# Patient Record
Sex: FEMALE | Race: BLACK OR AFRICAN AMERICAN | NOT HISPANIC OR LATINO
[De-identification: names, ages, dates, MRNs, and addresses within clinical notes are randomized per-mention and may not be internally consistent; named-entity substitution may affect disease eponyms.]

---

## 2020-09-20 ENCOUNTER — TRANSCRIPTION ENCOUNTER (OUTPATIENT)
Age: 70
End: 2020-09-20

## 2020-09-21 ENCOUNTER — INPATIENT (INPATIENT)
Facility: HOSPITAL | Age: 70
LOS: 7 days | Discharge: HOME | DRG: 909 | End: 2020-09-29
Attending: GENERAL ACUTE CARE HOSPITAL | Admitting: GENERAL ACUTE CARE HOSPITAL
Payer: MEDICARE

## 2020-09-21 VITALS
OXYGEN SATURATION: 95 % | HEART RATE: 67 BPM | HEIGHT: 68 IN | SYSTOLIC BLOOD PRESSURE: 113 MMHG | WEIGHT: 201.94 LBS | DIASTOLIC BLOOD PRESSURE: 66 MMHG | RESPIRATION RATE: 18 BRPM | TEMPERATURE: 98 F

## 2020-09-21 DIAGNOSIS — Z98.890 OTHER SPECIFIED POSTPROCEDURAL STATES: Chronic | ICD-10-CM

## 2020-09-21 PROCEDURE — 45331 SIGMOIDOSCOPY AND BIOPSY: CPT

## 2020-09-21 PROCEDURE — 99222 1ST HOSP IP/OBS MODERATE 55: CPT | Mod: 57

## 2020-09-21 PROCEDURE — 44238 UNLISTED LAPS PX INTESTINE: CPT | Mod: LT

## 2020-09-21 PROCEDURE — 99285 EMERGENCY DEPT VISIT HI MDM: CPT

## 2020-09-21 PROCEDURE — 71045 X-RAY EXAM CHEST 1 VIEW: CPT | Mod: 26

## 2020-09-21 RX ORDER — BUPIVACAINE 13.3 MG/ML
20 INJECTION, SUSPENSION, LIPOSOMAL INFILTRATION ONCE
Refills: 0 | Status: DISCONTINUED | OUTPATIENT
Start: 2020-09-21 | End: 2020-09-24

## 2020-09-21 RX ORDER — HYDROMORPHONE HYDROCHLORIDE 2 MG/ML
1 INJECTION INTRAMUSCULAR; INTRAVENOUS; SUBCUTANEOUS
Refills: 0 | Status: DISCONTINUED | OUTPATIENT
Start: 2020-09-21 | End: 2020-09-22

## 2020-09-21 RX ORDER — PIPERACILLIN AND TAZOBACTAM 4; .5 G/20ML; G/20ML
3.38 INJECTION, POWDER, LYOPHILIZED, FOR SOLUTION INTRAVENOUS EVERY 6 HOURS
Refills: 0 | Status: DISCONTINUED | OUTPATIENT
Start: 2020-09-21 | End: 2020-09-23

## 2020-09-21 RX ORDER — HYDROMORPHONE HYDROCHLORIDE 2 MG/ML
0.5 INJECTION INTRAMUSCULAR; INTRAVENOUS; SUBCUTANEOUS EVERY 4 HOURS
Refills: 0 | Status: DISCONTINUED | OUTPATIENT
Start: 2020-09-21 | End: 2020-09-21

## 2020-09-21 RX ORDER — PIPERACILLIN AND TAZOBACTAM 4; .5 G/20ML; G/20ML
3.38 INJECTION, POWDER, LYOPHILIZED, FOR SOLUTION INTRAVENOUS ONCE
Refills: 0 | Status: COMPLETED | OUTPATIENT
Start: 2020-09-21 | End: 2020-09-21

## 2020-09-21 RX ORDER — SODIUM CHLORIDE 9 MG/ML
1000 INJECTION, SOLUTION INTRAVENOUS
Refills: 0 | Status: DISCONTINUED | OUTPATIENT
Start: 2020-09-21 | End: 2020-09-21

## 2020-09-21 RX ORDER — HEPARIN SODIUM 5000 [USP'U]/ML
5000 INJECTION INTRAVENOUS; SUBCUTANEOUS EVERY 8 HOURS
Refills: 0 | Status: DISCONTINUED | OUTPATIENT
Start: 2020-09-21 | End: 2020-09-29

## 2020-09-21 RX ORDER — CEFTRIAXONE 500 MG/1
1000 INJECTION, POWDER, FOR SOLUTION INTRAMUSCULAR; INTRAVENOUS ONCE
Refills: 0 | Status: DISCONTINUED | OUTPATIENT
Start: 2020-09-21 | End: 2020-09-21

## 2020-09-21 RX ORDER — HYDROMORPHONE HYDROCHLORIDE 2 MG/ML
1 INJECTION INTRAMUSCULAR; INTRAVENOUS; SUBCUTANEOUS ONCE
Refills: 0 | Status: DISCONTINUED | OUTPATIENT
Start: 2020-09-21 | End: 2020-09-21

## 2020-09-21 RX ORDER — HEPARIN SODIUM 5000 [USP'U]/ML
5000 INJECTION INTRAVENOUS; SUBCUTANEOUS EVERY 8 HOURS
Refills: 0 | Status: DISCONTINUED | OUTPATIENT
Start: 2020-09-21 | End: 2020-09-21

## 2020-09-21 RX ORDER — OXYCODONE AND ACETAMINOPHEN 5; 325 MG/1; MG/1
1 TABLET ORAL EVERY 4 HOURS
Refills: 0 | Status: DISCONTINUED | OUTPATIENT
Start: 2020-09-21 | End: 2020-09-22

## 2020-09-21 RX ORDER — ONDANSETRON 8 MG/1
4 TABLET, FILM COATED ORAL EVERY 6 HOURS
Refills: 0 | Status: DISCONTINUED | OUTPATIENT
Start: 2020-09-21 | End: 2020-09-21

## 2020-09-21 RX ORDER — HYDROMORPHONE HYDROCHLORIDE 2 MG/ML
1 INJECTION INTRAMUSCULAR; INTRAVENOUS; SUBCUTANEOUS EVERY 4 HOURS
Refills: 0 | Status: DISCONTINUED | OUTPATIENT
Start: 2020-09-21 | End: 2020-09-21

## 2020-09-21 RX ORDER — PIPERACILLIN AND TAZOBACTAM 4; .5 G/20ML; G/20ML
3.38 INJECTION, POWDER, LYOPHILIZED, FOR SOLUTION INTRAVENOUS EVERY 6 HOURS
Refills: 0 | Status: DISCONTINUED | OUTPATIENT
Start: 2020-09-21 | End: 2020-09-21

## 2020-09-21 RX ORDER — METRONIDAZOLE 500 MG
500 TABLET ORAL ONCE
Refills: 0 | Status: DISCONTINUED | OUTPATIENT
Start: 2020-09-21 | End: 2020-09-21

## 2020-09-21 RX ORDER — LANOLIN ALCOHOL/MO/W.PET/CERES
3 CREAM (GRAM) TOPICAL AT BEDTIME
Refills: 0 | Status: DISCONTINUED | OUTPATIENT
Start: 2020-09-21 | End: 2020-09-29

## 2020-09-21 RX ORDER — OXYCODONE AND ACETAMINOPHEN 5; 325 MG/1; MG/1
2 TABLET ORAL EVERY 6 HOURS
Refills: 0 | Status: DISCONTINUED | OUTPATIENT
Start: 2020-09-21 | End: 2020-09-22

## 2020-09-21 RX ORDER — ONDANSETRON 8 MG/1
4 TABLET, FILM COATED ORAL EVERY 6 HOURS
Refills: 0 | Status: DISCONTINUED | OUTPATIENT
Start: 2020-09-21 | End: 2020-09-29

## 2020-09-21 RX ORDER — SODIUM CHLORIDE 9 MG/ML
1000 INJECTION, SOLUTION INTRAVENOUS
Refills: 0 | Status: DISCONTINUED | OUTPATIENT
Start: 2020-09-21 | End: 2020-09-25

## 2020-09-21 RX ADMIN — PIPERACILLIN AND TAZOBACTAM 200 GRAM(S): 4; .5 INJECTION, POWDER, LYOPHILIZED, FOR SOLUTION INTRAVENOUS at 12:21

## 2020-09-21 RX ADMIN — HYDROMORPHONE HYDROCHLORIDE 1 MILLIGRAM(S): 2 INJECTION INTRAMUSCULAR; INTRAVENOUS; SUBCUTANEOUS at 20:00

## 2020-09-21 RX ADMIN — HYDROMORPHONE HYDROCHLORIDE 1 MILLIGRAM(S): 2 INJECTION INTRAMUSCULAR; INTRAVENOUS; SUBCUTANEOUS at 12:00

## 2020-09-21 RX ADMIN — HYDROMORPHONE HYDROCHLORIDE 1 MILLIGRAM(S): 2 INJECTION INTRAMUSCULAR; INTRAVENOUS; SUBCUTANEOUS at 19:43

## 2020-09-21 RX ADMIN — SODIUM CHLORIDE 130 MILLILITER(S): 9 INJECTION, SOLUTION INTRAVENOUS at 13:13

## 2020-09-21 RX ADMIN — HYDROMORPHONE HYDROCHLORIDE 1 MILLIGRAM(S): 2 INJECTION INTRAMUSCULAR; INTRAVENOUS; SUBCUTANEOUS at 11:10

## 2020-09-21 RX ADMIN — PIPERACILLIN AND TAZOBACTAM 100 GRAM(S): 4; .5 INJECTION, POWDER, LYOPHILIZED, FOR SOLUTION INTRAVENOUS at 18:44

## 2020-09-21 RX ADMIN — HEPARIN SODIUM 5000 UNIT(S): 5000 INJECTION INTRAVENOUS; SUBCUTANEOUS at 21:54

## 2020-09-21 RX ADMIN — HYDROMORPHONE HYDROCHLORIDE 1 MILLIGRAM(S): 2 INJECTION INTRAMUSCULAR; INTRAVENOUS; SUBCUTANEOUS at 13:10

## 2020-09-21 RX ADMIN — HYDROMORPHONE HYDROCHLORIDE 1 MILLIGRAM(S): 2 INJECTION INTRAMUSCULAR; INTRAVENOUS; SUBCUTANEOUS at 13:40

## 2020-09-21 RX ADMIN — Medication 3 MILLIGRAM(S): at 21:54

## 2020-09-21 NOTE — ED PROVIDER NOTE - ATTENDING CONTRIBUTION TO CARE
70F PMH HTN, depression, GERD (s/p laparoscopic Nissen fundoplication in 2012 at [place]) p/w abd pain after colonoscopy this morning. GI Dr. Finkelstein called Dr. Marion. Reportedly +perf. No other systemic symptoms. Vitals wnl, exam as above.  ddx: concern for perf.  pre-op labs, symptom control/abx, d/w surgery, will admit for further care.

## 2020-09-21 NOTE — BRIEF OPERATIVE NOTE - OPERATION/FINDINGS
Procedure: repair of sigmoid Procedure: YONATAN, diagnostic laparoscopy, repair of sigmoid colon primarily, colonoscopy, methylene blue leak test    Laparoscopic port sites established and diagnostic laparoscopy performed. No obvious perforation identified. Colonoscopy performed, identifying a perforation at approximately 25 cm proximal from anal verge within sigmoid colon. Perforation was then identified and isolated laparoscopically. Given <50% circumferential involvement, perforation was sutured primarily in a two layer-fashion. Methylene blue leak test performed and found to be negative. 19Fr Henok placed in pelvis. Laparoscopic ports closed with 4-0 Monocryl.

## 2020-09-21 NOTE — PROGRESS NOTE ADULT - SUBJECTIVE AND OBJECTIVE BOX
POST-OPERATIVE NOTE    Procedure: diagnostic laparoscopy, YONATAN, repair of sigmoid colon primarily, colonoscopy, methylene blue leak test    Diagnosis/Indication: perforation in sigmoid     Surgeon: Bib     S: Pt has no complaints. Denies CP, SOB, KUMAR, calf tenderness. Pain controlled with medication. Denies vomiting, but endorses some trace nausea. AWRBF. VSS.    O:  T(C): 36.2 (09-21-20 @ 20:05), Max: 36.2 (09-21-20 @ 20:05)  T(F): 97.2 (09-21-20 @ 20:05), Max: 97.2 (09-21-20 @ 20:05)  HR: 78 (09-21-20 @ 20:30) (76 - 83)  BP: 114/68 (09-21-20 @ 20:30) (114/68 - 144/83)  RR: 18 (09-21-20 @ 20:30) (17 - 20)  SpO2: 95% (09-21-20 @ 20:30) (95% - 99%)  Wt(kg): --                        13.6   9.05  )-----------( 159      ( 21 Sep 2020 11:06 )             42.1     09-21    138  |  100  |  11  ----------------------------<  139<H>  3.7   |  26  |  0.94    Ca    9.6      21 Sep 2020 11:06    TPro  7.3  /  Alb  4.1  /  TBili  0.6  /  DBili  x   /  AST  22  /  ALT  14  /  AlkPhos  92  09-21      Gen: NAD, resting comfortably in bed  C/V: NSR  Pulm: Nonlabored breathing, no respiratory distress  Abd: post-operative distention, soft/ cherelle-incisional tenderness, incisions - clean, dry, glued.   : tolliver in place  Extrem: WWP, no calf edema or tenderness, SCDs in place

## 2020-09-21 NOTE — H&P ADULT - NSHPLABSRESULTS_GEN_ALL_CORE
LABS:                        13.6   9.05  )-----------( 159      ( 21 Sep 2020 11:06 )             42.1       PT/INR - ( 21 Sep 2020 11:06 )   PT: 12.7 sec;   INR: 1.06          PTT - ( 21 Sep 2020 11:06 )  PTT:29.1 sec  CAPILLARY BLOOD GLUCOSE      Radiology and Additional Studies:

## 2020-09-21 NOTE — H&P ADULT - ASSESSMENT
70F w/ PMHx of HTN, depression, GERD (s/p laparoscopic Nissen fundoplication in 2012 at [place]) presents with sharp abdominal pain following outpatient colonoscopy this morning. Afebrile, HDS. Clinical picture with concern for perforation. Added on urgently to OR for diagnostic lap.    -Admit to General Surgery Team 4-telemetry  -Pain/nausea control PRN  -NPO, IVF  -SQH, SCDs, OOB/A, IS  -AM labs  -Discussed with Chief resident on call and Dr. Mccartney 70F w/ PMHx of HTN, depression, GERD (s/p laparoscopic Nissen fundoplication in 2012 at [facility]) presents with sharp abdominal pain following outpatient colonoscopy this morning. Afebrile, HDS. Clinical picture with concern for perforation. Added on urgently to OR for diagnostic lap.    -Admit to General Surgery Team 4-telemetry  -Pain/nausea control PRN  -NPO, IVF  -Zosyn  -SQH, SCDs, OOB/A, IS  -AM labs  -Discussed with Chief resident on call and Dr. Mccartney

## 2020-09-21 NOTE — ED ADULT NURSE NOTE - CHPI ED NUR SYMPTOMS NEG
no diarrhea/no dysuria/no hematuria/no nausea/no abdominal distension/no blood in stool/no burning urination/no vomiting/no chills/no fever

## 2020-09-21 NOTE — ED PROVIDER NOTE - OBJECTIVE STATEMENT
69 y/o F w/ PMHx HTN, depression, GERD (s/p laparoscopic Nissen fundoplication in 2012 at [place]), presents to the ED sent from GI doctor s/p colonoscopy this morning for suspected perforated intestine during procedure, c/o severe pain all over abd so sent to ED. Denies fever, chills, n/v or any other symptoms.

## 2020-09-21 NOTE — H&P ADULT - HISTORY OF PRESENT ILLNESS
70F w/ PMHx of HTN, depression, GERD (s/p laparoscopic Nissen fundoplication in 2012 at [place]) presents with sharp abdominal pain following outpatient colonoscopy this morning. Per the patient, she was getitng her routine colonoscopy this monring and said that the doctor "saw a perforation" during the procedure. Her doctor, Dr. Finkelstein, called Dr. Marion and the patient was sent urgently to the [facility] ED. The patient reports a history of GERD for which she gets scoped periodically. She was due for a colonoscopy soon and was having LLQ abdominal pain for a few weeks with associated blood in the stool so she shcedule her exam for today. Her last colonoscopy was 2 years ago and was reportedly normal. She is currently in 10/10 pain and writhing in the bed. She describes the pain as diffuse, constant, and severe. No associated fevers or chills. No n/v, CP, SOB, dizziness, ligheheadedness, dysuria, constipation, diarrhea.     PMH: as above  PSH: Nissen 2012, hysterectomy 2008  Meds: HCTZ 25mg daily, protonix 40mg daily, prozac 60mg daily   All: ACE inhibitors, compazine   FH: noncontributory  SH: nonsmoker, social ETOH, no illicit substances

## 2020-09-21 NOTE — PACU DISCHARGE NOTE - COMMENTS
pt aao x3.  VSS.  lap sites to right abd x3 with dermabond intact.  LLQ RAN dsg c/d/i; RAN to ss with serosanguinous drainage.  IVF infusing.  tolliver to bsd.  denies c/o pain at present.  report given to RN on 8 lachman; pt to go to [facility] via bed with transport

## 2020-09-21 NOTE — ED ADULT NURSE NOTE - OBJECTIVE STATEMENT
PT presents s/p routine colonoscopy this am, srugical consult team greeted pt on arrival to ED, stated suspicion is high for perforated colon. Pt presents writhing in pain, states pain is worst in the left lower quadrant but radiates throughout lower abdomen. Pt presents afebrile.

## 2020-09-21 NOTE — ED PROVIDER NOTE - CLINICAL SUMMARY MEDICAL DECISION MAKING FREE TEXT BOX
69 y/o F presents to the ED sent from GI after had colonoscopy this morning w/ possible intestinal perforation. Here in ED VSS. Pt in a lot of pain. Surgery already evaluated at bedside. Planning to take pt to OR. Pre op labs sent. Given pain meds and covered w/ abx. Will f/u surgery recommendations.

## 2020-09-21 NOTE — ED PROVIDER NOTE - CONSTITUTIONAL, MLM
normal... Awake, alert, oriented to person, place, time/situation, lying on stretcher appears very uncomfortable

## 2020-09-21 NOTE — H&P ADULT - NSHPPHYSICALEXAM_GEN_ALL_CORE
Vital Signs Last 24 Hrs  T(C): 36.6 (21 Sep 2020 10:30), Max: 36.6 (21 Sep 2020 10:30)  T(F): 97.8 (21 Sep 2020 10:30), Max: 97.8 (21 Sep 2020 10:30)  HR: 67 (21 Sep 2020 10:30) (67 - 67)  BP: 113/66 (21 Sep 2020 10:30) (113/66 - 113/66)  BP(mean): --  RR: 18 (21 Sep 2020 10:30) (18 - 18)  SpO2: 95% (21 Sep 2020 10:30) (95% - 95%)    Physical Exam:  General: NAD, resting comfortably in bed  Pulmonary: Nonlabored breathing, no respiratory distress  Abdominal: obese, softly distended, +exquisitely tender to palpation throughout  Extremities: WWP  Neuro: A/O x 3 Vital Signs Last 24 Hrs  T(C): 36.6 (21 Sep 2020 10:30), Max: 36.6 (21 Sep 2020 10:30)  T(F): 97.8 (21 Sep 2020 10:30), Max: 97.8 (21 Sep 2020 10:30)  HR: 67 (21 Sep 2020 10:30) (67 - 67)  BP: 113/66 (21 Sep 2020 10:30) (113/66 - 113/66)  BP(mean): --  RR: 18 (21 Sep 2020 10:30) (18 - 18)  SpO2: 95% (21 Sep 2020 10:30) (95% - 95%)    Physical Exam:  General: pt writhing in pain   Pulmonary: Nonlabored breathing, no respiratory distress  Abdominal: obese, softly distended, +exquisitely tender to palpation throughout  Extremities: WWP  Neuro: A/O x 3

## 2020-09-21 NOTE — ED ADULT TRIAGE NOTE - CHIEF COMPLAINT QUOTE
patient BIBA from outpatient colonoscopy. found to have perforation in her colon. has been having LLQ abdominal pain for the last few weeks. denies fever, chills + blood in stool

## 2020-09-22 DIAGNOSIS — K21.9 GASTRO-ESOPHAGEAL REFLUX DISEASE WITHOUT ESOPHAGITIS: ICD-10-CM

## 2020-09-22 DIAGNOSIS — K63.1 PERFORATION OF INTESTINE (NONTRAUMATIC): ICD-10-CM

## 2020-09-22 DIAGNOSIS — D69.6 THROMBOCYTOPENIA, UNSPECIFIED: ICD-10-CM

## 2020-09-22 DIAGNOSIS — F32.9 MAJOR DEPRESSIVE DISORDER, SINGLE EPISODE, UNSPECIFIED: ICD-10-CM

## 2020-09-22 DIAGNOSIS — I10 ESSENTIAL (PRIMARY) HYPERTENSION: ICD-10-CM

## 2020-09-22 DIAGNOSIS — D64.9 ANEMIA, UNSPECIFIED: ICD-10-CM

## 2020-09-22 DIAGNOSIS — E66.9 OBESITY, UNSPECIFIED: ICD-10-CM

## 2020-09-22 DIAGNOSIS — E87.6 HYPOKALEMIA: ICD-10-CM

## 2020-09-22 DIAGNOSIS — D72.829 ELEVATED WHITE BLOOD CELL COUNT, UNSPECIFIED: ICD-10-CM

## 2020-09-22 LAB
ANION GAP SERPL CALC-SCNC: 12 MMOL/L — SIGNIFICANT CHANGE UP (ref 5–17)
BUN SERPL-MCNC: 11 MG/DL — SIGNIFICANT CHANGE UP (ref 7–23)
CALCIUM SERPL-MCNC: 8.6 MG/DL — SIGNIFICANT CHANGE UP (ref 8.4–10.5)
CHLORIDE SERPL-SCNC: 99 MMOL/L — SIGNIFICANT CHANGE UP (ref 96–108)
CO2 SERPL-SCNC: 26 MMOL/L — SIGNIFICANT CHANGE UP (ref 22–31)
CREAT SERPL-MCNC: 0.96 MG/DL — SIGNIFICANT CHANGE UP (ref 0.5–1.3)
GLUCOSE SERPL-MCNC: 142 MG/DL — HIGH (ref 70–99)
HCT VFR BLD CALC: 35.4 % — SIGNIFICANT CHANGE UP (ref 34.5–45)
HGB BLD-MCNC: 11.4 G/DL — LOW (ref 11.5–15.5)
MAGNESIUM SERPL-MCNC: 1.8 MG/DL — SIGNIFICANT CHANGE UP (ref 1.6–2.6)
MCHC RBC-ENTMCNC: 27.2 PG — SIGNIFICANT CHANGE UP (ref 27–34)
MCHC RBC-ENTMCNC: 32.2 GM/DL — SIGNIFICANT CHANGE UP (ref 32–36)
MCV RBC AUTO: 84.5 FL — SIGNIFICANT CHANGE UP (ref 80–100)
NRBC # BLD: 0 /100 WBCS — SIGNIFICANT CHANGE UP (ref 0–0)
PHOSPHATE SERPL-MCNC: 2.8 MG/DL — SIGNIFICANT CHANGE UP (ref 2.5–4.5)
PLATELET # BLD AUTO: 134 K/UL — LOW (ref 150–400)
POTASSIUM SERPL-MCNC: 3.3 MMOL/L — LOW (ref 3.5–5.3)
POTASSIUM SERPL-SCNC: 3.3 MMOL/L — LOW (ref 3.5–5.3)
RBC # BLD: 4.19 M/UL — SIGNIFICANT CHANGE UP (ref 3.8–5.2)
RBC # FLD: 16.5 % — HIGH (ref 10.3–14.5)
SODIUM SERPL-SCNC: 137 MMOL/L — SIGNIFICANT CHANGE UP (ref 135–145)
WBC # BLD: 14.29 K/UL — HIGH (ref 3.8–10.5)
WBC # FLD AUTO: 14.29 K/UL — HIGH (ref 3.8–10.5)

## 2020-09-22 PROCEDURE — 99223 1ST HOSP IP/OBS HIGH 75: CPT

## 2020-09-22 RX ORDER — MAGNESIUM SULFATE 500 MG/ML
1 VIAL (ML) INJECTION ONCE
Refills: 0 | Status: COMPLETED | OUTPATIENT
Start: 2020-09-22 | End: 2020-09-22

## 2020-09-22 RX ORDER — FLUOXETINE HCL 10 MG
60 CAPSULE ORAL DAILY
Refills: 0 | Status: DISCONTINUED | OUTPATIENT
Start: 2020-09-22 | End: 2020-09-29

## 2020-09-22 RX ORDER — ZOLPIDEM TARTRATE 10 MG/1
5 TABLET ORAL AT BEDTIME
Refills: 0 | Status: DISCONTINUED | OUTPATIENT
Start: 2020-09-22 | End: 2020-09-25

## 2020-09-22 RX ORDER — OXYCODONE HYDROCHLORIDE 5 MG/1
10 TABLET ORAL EVERY 6 HOURS
Refills: 0 | Status: DISCONTINUED | OUTPATIENT
Start: 2020-09-22 | End: 2020-09-29

## 2020-09-22 RX ORDER — SUMATRIPTAN SUCCINATE 4 MG/.5ML
25 INJECTION, SOLUTION SUBCUTANEOUS ONCE
Refills: 0 | Status: COMPLETED | OUTPATIENT
Start: 2020-09-22 | End: 2020-09-22

## 2020-09-22 RX ORDER — PANTOPRAZOLE SODIUM 20 MG/1
40 TABLET, DELAYED RELEASE ORAL
Refills: 0 | Status: DISCONTINUED | OUTPATIENT
Start: 2020-09-22 | End: 2020-09-29

## 2020-09-22 RX ORDER — POTASSIUM CHLORIDE 20 MEQ
40 PACKET (EA) ORAL ONCE
Refills: 0 | Status: COMPLETED | OUTPATIENT
Start: 2020-09-22 | End: 2020-09-22

## 2020-09-22 RX ORDER — ACETAMINOPHEN 500 MG
650 TABLET ORAL EVERY 6 HOURS
Refills: 0 | Status: DISCONTINUED | OUTPATIENT
Start: 2020-09-22 | End: 2020-09-29

## 2020-09-22 RX ORDER — OXYCODONE HYDROCHLORIDE 5 MG/1
5 TABLET ORAL EVERY 4 HOURS
Refills: 0 | Status: DISCONTINUED | OUTPATIENT
Start: 2020-09-22 | End: 2020-09-29

## 2020-09-22 RX ORDER — ONDANSETRON 8 MG/1
4 TABLET, FILM COATED ORAL ONCE
Refills: 0 | Status: COMPLETED | OUTPATIENT
Start: 2020-09-22 | End: 2020-09-22

## 2020-09-22 RX ADMIN — OXYCODONE AND ACETAMINOPHEN 2 TABLET(S): 5; 325 TABLET ORAL at 15:31

## 2020-09-22 RX ADMIN — ONDANSETRON 4 MILLIGRAM(S): 8 TABLET, FILM COATED ORAL at 21:30

## 2020-09-22 RX ADMIN — Medication 3 MILLIGRAM(S): at 21:31

## 2020-09-22 RX ADMIN — OXYCODONE AND ACETAMINOPHEN 1 TABLET(S): 5; 325 TABLET ORAL at 07:00

## 2020-09-22 RX ADMIN — OXYCODONE AND ACETAMINOPHEN 2 TABLET(S): 5; 325 TABLET ORAL at 16:30

## 2020-09-22 RX ADMIN — Medication 40 MILLIEQUIVALENT(S): at 14:30

## 2020-09-22 RX ADMIN — HEPARIN SODIUM 5000 UNIT(S): 5000 INJECTION INTRAVENOUS; SUBCUTANEOUS at 06:10

## 2020-09-22 RX ADMIN — HEPARIN SODIUM 5000 UNIT(S): 5000 INJECTION INTRAVENOUS; SUBCUTANEOUS at 21:31

## 2020-09-22 RX ADMIN — OXYCODONE HYDROCHLORIDE 5 MILLIGRAM(S): 5 TABLET ORAL at 19:50

## 2020-09-22 RX ADMIN — OXYCODONE AND ACETAMINOPHEN 1 TABLET(S): 5; 325 TABLET ORAL at 00:36

## 2020-09-22 RX ADMIN — PIPERACILLIN AND TAZOBACTAM 100 GRAM(S): 4; .5 INJECTION, POWDER, LYOPHILIZED, FOR SOLUTION INTRAVENOUS at 18:58

## 2020-09-22 RX ADMIN — OXYCODONE AND ACETAMINOPHEN 1 TABLET(S): 5; 325 TABLET ORAL at 01:36

## 2020-09-22 RX ADMIN — PIPERACILLIN AND TAZOBACTAM 100 GRAM(S): 4; .5 INJECTION, POWDER, LYOPHILIZED, FOR SOLUTION INTRAVENOUS at 06:10

## 2020-09-22 RX ADMIN — HEPARIN SODIUM 5000 UNIT(S): 5000 INJECTION INTRAVENOUS; SUBCUTANEOUS at 14:30

## 2020-09-22 RX ADMIN — ZOLPIDEM TARTRATE 5 MILLIGRAM(S): 10 TABLET ORAL at 22:46

## 2020-09-22 RX ADMIN — PIPERACILLIN AND TAZOBACTAM 100 GRAM(S): 4; .5 INJECTION, POWDER, LYOPHILIZED, FOR SOLUTION INTRAVENOUS at 00:25

## 2020-09-22 RX ADMIN — SUMATRIPTAN SUCCINATE 25 MILLIGRAM(S): 4 INJECTION, SOLUTION SUBCUTANEOUS at 11:15

## 2020-09-22 RX ADMIN — ONDANSETRON 4 MILLIGRAM(S): 8 TABLET, FILM COATED ORAL at 08:59

## 2020-09-22 RX ADMIN — OXYCODONE AND ACETAMINOPHEN 1 TABLET(S): 5; 325 TABLET ORAL at 06:16

## 2020-09-22 RX ADMIN — Medication 100 GRAM(S): at 10:55

## 2020-09-22 RX ADMIN — SUMATRIPTAN SUCCINATE 25 MILLIGRAM(S): 4 INJECTION, SOLUTION SUBCUTANEOUS at 10:29

## 2020-09-22 RX ADMIN — Medication 40 MILLIEQUIVALENT(S): at 10:55

## 2020-09-22 RX ADMIN — ONDANSETRON 4 MILLIGRAM(S): 8 TABLET, FILM COATED ORAL at 12:15

## 2020-09-22 RX ADMIN — PIPERACILLIN AND TAZOBACTAM 100 GRAM(S): 4; .5 INJECTION, POWDER, LYOPHILIZED, FOR SOLUTION INTRAVENOUS at 12:04

## 2020-09-22 NOTE — PROGRESS NOTE ADULT - SUBJECTIVE AND OBJECTIVE BOX
HX: POD1 s/p diagnostic laparoscopy, YONATAN, repair of sigmoid colon primarily, colonoscopy, methylene blue leak test     SUBJECTIVE: Patient seen and examined bedside by chief resident. Endorses mild nausea without emesis. Endorses gas, no BMs. On Zosyn.     I&O  OUT:     RAN: 30/55    UOP: 365/900    Vital Signs Last 24 Hrs  T(C): 37 (22 Sep 2020 04:58), Max: 37 (22 Sep 2020 04:58)  T(F): 98.6 (22 Sep 2020 04:58), Max: 98.6 (22 Sep 2020 04:58)  HR: 70 (22 Sep 2020 04:17) (67 - 83)  BP: 117/56 (22 Sep 2020 04:17) (113/66 - 144/83)  BP(mean): 81 (22 Sep 2020 04:17) (81 - 108)  RR: 17 (22 Sep 2020 04:17) (17 - 20)  SpO2: 97% (22 Sep 2020 04:17) (95% - 99%)    General: NAD, resting comfortably in bed  C/V: pulses present in b/l upper extremities   Pulm: Nonlabored breathing, no respiratory distress  Abd: soft, distended, mildly tender, no rebound or guarding, RAN L abdomen SS   Extrem: WWP, no edema, SCDs in place

## 2020-09-22 NOTE — CONSULT NOTE ADULT - ASSESSMENT
70F w/ PMHx of HTN, depression, GERD (s/p laparoscopic Nissen fundoplication in 2012 at [place]) presents with sharp abdominal pain following outpatient colonoscopy this morning with clinical picture with concern for perforation now s/p diagnostic laparoscopy, YONATAN, repair of sigmoid colon primarily.

## 2020-09-22 NOTE — CONSULT NOTE ADULT - SUBJECTIVE AND OBJECTIVE BOX
Patient is a 70y old  Female who presents with a chief complaint of abd pain s/p colonoscopy (21 Sep 2020 20:54)      HPI:  70F w/ PMHx of HTN, depression, GERD (s/p laparoscopic Nissen fundoplication in 2012 at [place]) presents with sharp abdominal pain following outpatient colonoscopy this morning. Per the patient, she was getitng her routine colonoscopy this monring and said that the doctor "saw a perforation" during the procedure. Her doctor, Dr. Finkelstein, called Dr. Marion and the patient was sent urgently to the [facility] ED. The patient reports a history of GERD for which she gets scoped periodically. She was due for a colonoscopy soon and was having LLQ abdominal pain for a few weeks with associated blood in the stool so she shcedule her exam for today. Her last colonoscopy was 2 years ago and was reportedly normal. She is currently in 10/10 pain and writhing in the bed. Clinical picture with concern for perforation now s/p diagnostic laparoscopy, YONATAN, repair of sigmoid colon primarily. Distended with flatus today.     Subjective:  Pt reports some Nausea. Denies skyler pain. Denies SOB/CP. Slight headache. ROS is otherwise negative.     Allergies    ACE inhibitors (Unknown)  Compazine (Unknown)    Intolerances    Home meds: Prozac, HCTZ 25 mg PO qd    MEDICATIONS  (STANDING):  BUpivacaine liposome 1.3% Injectable (no eMAR) 20 milliLiter(s) Local Injection once  FLUoxetine 60 milliGRAM(s) Oral daily  heparin   Injectable 5000 Unit(s) SubCutaneous every 8 hours  lactated ringers. 1000 milliLiter(s) (130 mL/Hr) IV Continuous <Continuous>  melatonin 3 milliGRAM(s) Oral at bedtime  pantoprazole    Tablet 40 milliGRAM(s) Oral before breakfast  piperacillin/tazobactam IVPB.. 3.375 Gram(s) IV Intermittent every 6 hours    MEDICATIONS  (PRN):  ondansetron Injectable 4 milliGRAM(s) IV Push every 6 hours PRN Nausea  oxycodone    5 mG/acetaminophen 325 mG 1 Tablet(s) Oral every 4 hours PRN Moderate Pain (4 - 6)  oxycodone    5 mG/acetaminophen 325 mG 2 Tablet(s) Oral every 6 hours PRN Severe Pain (7 - 10)  zolpidem 5 milliGRAM(s) Oral at bedtime PRN Insomnia  zolpidem 5 milliGRAM(s) Oral at bedtime PRN Insomnia      Drug Dosing Weight  Height (cm): 172.7 (21 Sep 2020 10:30)  Weight (kg): 91.6 (21 Sep 2020 10:30)  BMI (kg/m2): 30.7 (21 Sep 2020 10:30)  BSA (m2): 2.05 (21 Sep 2020 10:30)    PAST MEDICAL & SURGICAL HISTORY:  GERD (gastroesophageal reflux disease)    Depression    HTN (hypertension)    History of Nissen fundoplication        FAMILY HISTORY:  no reported cardiac history reported    SOCIAL HISTORY:  social EtOH use, no smoking, no drug use    ADVANCE DIRECTIVES:    Vital Signs Last 24 Hrs  T(C): 36.3 (22 Sep 2020 14:26), Max: 37 (22 Sep 2020 04:58)  T(F): 97.4 (22 Sep 2020 14:26), Max: 98.6 (22 Sep 2020 04:58)  HR: 64 (22 Sep 2020 12:10) (64 - 83)  BP: 141/74 (22 Sep 2020 12:10) (114/68 - 144/83)  BP(mean): 102 (22 Sep 2020 12:10) (81 - 108)  ABP: --  ABP(mean): --  RR: 16 (22 Sep 2020 12:10) (16 - 20)  SpO2: 93% (22 Sep 2020 12:10) (93% - 99%)          I&O's Detail    21 Sep 2020 07:01  -  22 Sep 2020 07:00  --------------------------------------------------------  IN:    IV PiggyBack: 200 mL    Lactated Ringers: 440 mL  Total IN: 640 mL    OUT:    Bulb (mL): 55 mL    Indwelling Catheter - Urethral (mL): 515 mL  Total OUT: 570 mL    Total NET: 70 mL      22 Sep 2020 07:01  -  22 Sep 2020 16:29  --------------------------------------------------------  IN:    IV PiggyBack: 200 mL    Lactated Ringers: 550 mL    Oral Fluid: 330 mL  Total IN: 1080 mL    OUT:    Indwelling Catheter - Urethral (mL): 200 mL    Voided (mL): 500 mL  Total OUT: 700 mL    Total NET: 380 mL          PHYSICAL EXAM:      Constitutional: NAD  Eyes: PERRLA  ENMT: MMM  Neck: supple  Back: midline  Respiratory: CTA b/l  Cardiovascular: rrr, s1s2, no m/r/g  Gastrointestinal: soft, distended, mild TTP, RAN drain +  Extremities: wwp  Vascular: + 2 pulses radial  Neurological: AAO x 4  Skin: no rash  Lymph Nodes: no LAD  Musculoskeletal: no joint swelling  Psychiatric: normal affect    LABS:  CBC Full  -  ( 22 Sep 2020 09:56 )  WBC Count : 14.29 K/uL  RBC Count : 4.19 M/uL  Hemoglobin : 11.4 g/dL  Hematocrit : 35.4 %  Platelet Count - Automated : 134 K/uL  Mean Cell Volume : 84.5 fl  Mean Cell Hemoglobin : 27.2 pg  Mean Cell Hemoglobin Concentration : 32.2 gm/dL  Auto Neutrophil # : x  Auto Lymphocyte # : x  Auto Monocyte # : x  Auto Eosinophil # : x  Auto Basophil # : x  Auto Neutrophil % : x  Auto Lymphocyte % : x  Auto Monocyte % : x  Auto Eosinophil % : x  Auto Basophil % : x    09-22    137  |  99  |  11  ----------------------------<  142<H>  3.3<L>   |  26  |  0.96    Ca    8.6      22 Sep 2020 09:56  Phos  2.8     09-22  Mg     1.8     09-22    TPro  7.3  /  Alb  4.1  /  TBili  0.6  /  DBili  x   /  AST  22  /  ALT  14  /  AlkPhos  92  09-21    CAPILLARY BLOOD GLUCOSE        PT/INR - ( 21 Sep 2020 11:06 )   PT: 12.7 sec;   INR: 1.06          PTT - ( 21 Sep 2020 11:06 )  PTT:29.1 sec      EKG:    ECHO, US:    RADIOLOGY:

## 2020-09-23 LAB
ANION GAP SERPL CALC-SCNC: 9 MMOL/L — SIGNIFICANT CHANGE UP (ref 5–17)
BUN SERPL-MCNC: 6 MG/DL — LOW (ref 7–23)
CALCIUM SERPL-MCNC: 8.7 MG/DL — SIGNIFICANT CHANGE UP (ref 8.4–10.5)
CHLORIDE SERPL-SCNC: 102 MMOL/L — SIGNIFICANT CHANGE UP (ref 96–108)
CO2 SERPL-SCNC: 29 MMOL/L — SIGNIFICANT CHANGE UP (ref 22–31)
CREAT SERPL-MCNC: 0.83 MG/DL — SIGNIFICANT CHANGE UP (ref 0.5–1.3)
GLUCOSE SERPL-MCNC: 110 MG/DL — HIGH (ref 70–99)
HCT VFR BLD CALC: 33.7 % — LOW (ref 34.5–45)
HGB BLD-MCNC: 10.8 G/DL — LOW (ref 11.5–15.5)
MAGNESIUM SERPL-MCNC: 2.2 MG/DL — SIGNIFICANT CHANGE UP (ref 1.6–2.6)
MCHC RBC-ENTMCNC: 27.3 PG — SIGNIFICANT CHANGE UP (ref 27–34)
MCHC RBC-ENTMCNC: 32 GM/DL — SIGNIFICANT CHANGE UP (ref 32–36)
MCV RBC AUTO: 85.1 FL — SIGNIFICANT CHANGE UP (ref 80–100)
NRBC # BLD: 0 /100 WBCS — SIGNIFICANT CHANGE UP (ref 0–0)
PHOSPHATE SERPL-MCNC: 2 MG/DL — LOW (ref 2.5–4.5)
PLATELET # BLD AUTO: 131 K/UL — LOW (ref 150–400)
POTASSIUM SERPL-MCNC: 3.6 MMOL/L — SIGNIFICANT CHANGE UP (ref 3.5–5.3)
POTASSIUM SERPL-SCNC: 3.6 MMOL/L — SIGNIFICANT CHANGE UP (ref 3.5–5.3)
RBC # BLD: 3.96 M/UL — SIGNIFICANT CHANGE UP (ref 3.8–5.2)
RBC # FLD: 16.4 % — HIGH (ref 10.3–14.5)
SODIUM SERPL-SCNC: 140 MMOL/L — SIGNIFICANT CHANGE UP (ref 135–145)
WBC # BLD: 12.01 K/UL — HIGH (ref 3.8–10.5)
WBC # FLD AUTO: 12.01 K/UL — HIGH (ref 3.8–10.5)

## 2020-09-23 PROCEDURE — 99233 SBSQ HOSP IP/OBS HIGH 50: CPT | Mod: GC

## 2020-09-23 RX ORDER — CEFTRIAXONE 500 MG/1
1000 INJECTION, POWDER, FOR SOLUTION INTRAMUSCULAR; INTRAVENOUS EVERY 24 HOURS
Refills: 0 | Status: DISCONTINUED | OUTPATIENT
Start: 2020-09-23 | End: 2020-09-28

## 2020-09-23 RX ORDER — POTASSIUM PHOSPHATE, MONOBASIC POTASSIUM PHOSPHATE, DIBASIC 236; 224 MG/ML; MG/ML
15 INJECTION, SOLUTION INTRAVENOUS ONCE
Refills: 0 | Status: COMPLETED | OUTPATIENT
Start: 2020-09-23 | End: 2020-09-23

## 2020-09-23 RX ORDER — METRONIDAZOLE 500 MG
TABLET ORAL
Refills: 0 | Status: DISCONTINUED | OUTPATIENT
Start: 2020-09-23 | End: 2020-09-28

## 2020-09-23 RX ORDER — POTASSIUM CHLORIDE 20 MEQ
20 PACKET (EA) ORAL ONCE
Refills: 0 | Status: COMPLETED | OUTPATIENT
Start: 2020-09-23 | End: 2020-09-23

## 2020-09-23 RX ORDER — METRONIDAZOLE 500 MG
500 TABLET ORAL ONCE
Refills: 0 | Status: COMPLETED | OUTPATIENT
Start: 2020-09-23 | End: 2020-09-23

## 2020-09-23 RX ORDER — METRONIDAZOLE 500 MG
500 TABLET ORAL EVERY 8 HOURS
Refills: 0 | Status: DISCONTINUED | OUTPATIENT
Start: 2020-09-23 | End: 2020-09-28

## 2020-09-23 RX ADMIN — PANTOPRAZOLE SODIUM 40 MILLIGRAM(S): 20 TABLET, DELAYED RELEASE ORAL at 05:27

## 2020-09-23 RX ADMIN — OXYCODONE HYDROCHLORIDE 5 MILLIGRAM(S): 5 TABLET ORAL at 18:50

## 2020-09-23 RX ADMIN — OXYCODONE HYDROCHLORIDE 5 MILLIGRAM(S): 5 TABLET ORAL at 12:43

## 2020-09-23 RX ADMIN — PIPERACILLIN AND TAZOBACTAM 100 GRAM(S): 4; .5 INJECTION, POWDER, LYOPHILIZED, FOR SOLUTION INTRAVENOUS at 00:00

## 2020-09-23 RX ADMIN — Medication 100 MILLIGRAM(S): at 23:05

## 2020-09-23 RX ADMIN — POTASSIUM PHOSPHATE, MONOBASIC POTASSIUM PHOSPHATE, DIBASIC 63.75 MILLIMOLE(S): 236; 224 INJECTION, SOLUTION INTRAVENOUS at 09:23

## 2020-09-23 RX ADMIN — Medication 3 MILLIGRAM(S): at 23:14

## 2020-09-23 RX ADMIN — HEPARIN SODIUM 5000 UNIT(S): 5000 INJECTION INTRAVENOUS; SUBCUTANEOUS at 05:28

## 2020-09-23 RX ADMIN — ZOLPIDEM TARTRATE 5 MILLIGRAM(S): 10 TABLET ORAL at 23:13

## 2020-09-23 RX ADMIN — Medication 60 MILLIGRAM(S): at 11:45

## 2020-09-23 RX ADMIN — OXYCODONE HYDROCHLORIDE 5 MILLIGRAM(S): 5 TABLET ORAL at 18:14

## 2020-09-23 RX ADMIN — HEPARIN SODIUM 5000 UNIT(S): 5000 INJECTION INTRAVENOUS; SUBCUTANEOUS at 23:06

## 2020-09-23 RX ADMIN — PIPERACILLIN AND TAZOBACTAM 100 GRAM(S): 4; .5 INJECTION, POWDER, LYOPHILIZED, FOR SOLUTION INTRAVENOUS at 05:27

## 2020-09-23 RX ADMIN — ONDANSETRON 4 MILLIGRAM(S): 8 TABLET, FILM COATED ORAL at 20:58

## 2020-09-23 RX ADMIN — CEFTRIAXONE 100 MILLIGRAM(S): 500 INJECTION, POWDER, FOR SOLUTION INTRAMUSCULAR; INTRAVENOUS at 16:30

## 2020-09-23 RX ADMIN — OXYCODONE HYDROCHLORIDE 5 MILLIGRAM(S): 5 TABLET ORAL at 07:13

## 2020-09-23 RX ADMIN — Medication 100 MILLIGRAM(S): at 17:12

## 2020-09-23 NOTE — PROGRESS NOTE ADULT - SUBJECTIVE AND OBJECTIVE BOX
O/N and interval events: None    Subjective:  Pt has no acute complaints. Denies skyler pain. Denies SOB/CP. Slight headache. ROS is otherwise negative.     Allergies    ACE inhibitors (Unknown)  Compazine (Unknown)    Intolerances    Home meds: Prozac, HCTZ 25 mg PO qd    MEDICATIONS  (STANDING):  BUpivacaine liposome 1.3% Injectable (no eMAR) 20 milliLiter(s) Local Injection once  cefTRIAXone   IVPB 1000 milliGRAM(s) IV Intermittent every 24 hours  FLUoxetine 60 milliGRAM(s) Oral daily  heparin   Injectable 5000 Unit(s) SubCutaneous every 8 hours  lactated ringers. 1000 milliLiter(s) (75 mL/Hr) IV Continuous <Continuous>  melatonin 3 milliGRAM(s) Oral at bedtime  metroNIDAZOLE  IVPB      metroNIDAZOLE  IVPB 500 milliGRAM(s) IV Intermittent once  metroNIDAZOLE  IVPB 500 milliGRAM(s) IV Intermittent every 8 hours  pantoprazole    Tablet 40 milliGRAM(s) Oral before breakfast  potassium chloride   Powder 20 milliEquivalent(s) Oral once    MEDICATIONS  (PRN):  acetaminophen   Tablet .. 650 milliGRAM(s) Oral every 6 hours PRN Mild Pain (1 - 3)  ondansetron Injectable 4 milliGRAM(s) IV Push every 6 hours PRN Nausea  oxyCODONE    IR 5 milliGRAM(s) Oral every 4 hours PRN Moderate Pain (4 - 6)  oxyCODONE    IR 10 milliGRAM(s) Oral every 6 hours PRN Severe Pain (7 - 10)  zolpidem 5 milliGRAM(s) Oral at bedtime PRN Insomnia  zolpidem 5 milliGRAM(s) Oral at bedtime PRN Insomnia        Drug Dosing Weight  Height (cm): 172.7 (21 Sep 2020 10:30)  Weight (kg): 91.6 (21 Sep 2020 10:30)  BMI (kg/m2): 30.7 (21 Sep 2020 10:30)  BSA (m2): 2.05 (21 Sep 2020 10:30)    PAST MEDICAL & SURGICAL HISTORY:  GERD (gastroesophageal reflux disease)    Depression    HTN (hypertension)    History of Nissen fundoplication        FAMILY HISTORY:  no reported cardiac history reported    SOCIAL HISTORY:  social EtOH use, no smoking, no drug use    Vital Signs Last 24 Hrs  T(C): 36.9 (23 Sep 2020 10:00), Max: 37.4 (22 Sep 2020 18:05)  T(F): 98.4 (23 Sep 2020 10:00), Max: 99.4 (22 Sep 2020 18:05)  HR: 76 (23 Sep 2020 08:12) (66 - 78)  BP: 120/59 (23 Sep 2020 08:12) (120/59 - 138/72)  BP(mean): 85 (23 Sep 2020 08:12) (85 - 98)  RR: 18 (23 Sep 2020 08:12) (16 - 18)  SpO2: 91% (23 Sep 2020 08:12) (91% - 100%)ADVANCE DIRECTIVES:          PHYSICAL EXAM:      Constitutional: NAD  Eyes: PERRLA  ENMT: MMM  Neck: supple  Back: midline  Respiratory: CTA b/l  Cardiovascular: rrr, s1s2, no m/r/g  Gastrointestinal: soft, distended, mild TTP, RAN drain +  Extremities: wwp  Vascular: + 2 pulses radial  Neurological: AAO x 4  Skin: no rash  Lymph Nodes: no LAD  Musculoskeletal: no joint swelling  Psychiatric: normal affect    LABS:                          10.8   12.01 )-----------( 131      ( 23 Sep 2020 06:05 )             33.7   09-23    140  |  102  |  6<L>  ----------------------------<  110<H>  3.6   |  29  |  0.83    Ca    8.7      23 Sep 2020 06:04  Phos  2.0     09-23  Mg     2.2     09-23        EKG:    ECHO, US:    RADIOLOGY:

## 2020-09-23 NOTE — PROGRESS NOTE ADULT - SUBJECTIVE AND OBJECTIVE BOX
24 hr events:  ON: + flatus/ - BM, some nausea w/o emesis. soft, diffusely tender. VSS. ASHLI.   9/22: AM labs not drawn, stat labs back @10AM H/H 11.4/35.4, sumatriptain 25 x1 for migraine, med rec obtained, holding HCTZ, tolliver dc'd, passed TOV, increased distension, minimal flatus, made NPO except meds     SUBJECTIVE:  Pt seen and examined by chief resident. Pt is doing well, resting comfortably on bed. Pt reporting some pain but improving. Passing minimal gas. No bowel movements. No complaints at this time.    Vital Signs Last 24 Hrs  T(C): 36.8 (23 Sep 2020 05:07), Max: 37.4 (22 Sep 2020 18:05)  T(F): 98.3 (23 Sep 2020 05:07), Max: 99.4 (22 Sep 2020 18:05)  HR: 66 (23 Sep 2020 03:38) (64 - 78)  BP: 130/70 (23 Sep 2020 03:38) (119/66 - 141/74)  BP(mean): 95 (23 Sep 2020 03:38) (87 - 102)  RR: 18 (23 Sep 2020 03:38) (16 - 18)  SpO2: 100% (23 Sep 2020 03:38) (93% - 100%)    Physical Exam:  General: NAD  Pulmonary: Nonlabored breathing, no respiratory distress  Cardiovascular: NSR  Abdominal: soft, minimal distention, nontender, RAN serosanguinous No rebound or guarding.   Extremities: WWP, SCDs in place    I&O's Summary    22 Sep 2020 07:01  -  23 Sep 2020 07:00  --------------------------------------------------------  IN: 1635 mL / OUT: 761 mL / NET: 874 mL        LABS:                        10.8   12.01 )-----------( 131      ( 23 Sep 2020 06:05 )             33.7     09-23    140  |  102  |  6<L>  ----------------------------<  110<H>  3.6   |  29  |  0.83    Ca    8.7      23 Sep 2020 06:04  Phos  2.0     09-23  Mg     2.2     09-23    TPro  7.3  /  Alb  4.1  /  TBili  0.6  /  DBili  x   /  AST  22  /  ALT  14  /  AlkPhos  92  09-21    PT/INR - ( 21 Sep 2020 11:06 )   PT: 12.7 sec;   INR: 1.06     PTT - ( 21 Sep 2020 11:06 )  PTT:29.1 sec    LIVER FUNCTIONS - ( 21 Sep 2020 11:06 )  Alb: 4.1 g/dL / Pro: 7.3 g/dL / ALK PHOS: 92 U/L / ALT: 14 U/L / AST: 22 U/L / GGT: x

## 2020-09-24 LAB
ANION GAP SERPL CALC-SCNC: 10 MMOL/L — SIGNIFICANT CHANGE UP (ref 5–17)
BUN SERPL-MCNC: 4 MG/DL — LOW (ref 7–23)
CALCIUM SERPL-MCNC: 8.5 MG/DL — SIGNIFICANT CHANGE UP (ref 8.4–10.5)
CHLORIDE SERPL-SCNC: 100 MMOL/L — SIGNIFICANT CHANGE UP (ref 96–108)
CO2 SERPL-SCNC: 28 MMOL/L — SIGNIFICANT CHANGE UP (ref 22–31)
CREAT SERPL-MCNC: 0.82 MG/DL — SIGNIFICANT CHANGE UP (ref 0.5–1.3)
GLUCOSE SERPL-MCNC: 121 MG/DL — HIGH (ref 70–99)
HCT VFR BLD CALC: 32.1 % — LOW (ref 34.5–45)
HGB BLD-MCNC: 10.3 G/DL — LOW (ref 11.5–15.5)
MAGNESIUM SERPL-MCNC: 2 MG/DL — SIGNIFICANT CHANGE UP (ref 1.6–2.6)
MCHC RBC-ENTMCNC: 27.2 PG — SIGNIFICANT CHANGE UP (ref 27–34)
MCHC RBC-ENTMCNC: 32.1 GM/DL — SIGNIFICANT CHANGE UP (ref 32–36)
MCV RBC AUTO: 84.9 FL — SIGNIFICANT CHANGE UP (ref 80–100)
NRBC # BLD: 0 /100 WBCS — SIGNIFICANT CHANGE UP (ref 0–0)
PHOSPHATE SERPL-MCNC: 1.6 MG/DL — LOW (ref 2.5–4.5)
PLATELET # BLD AUTO: 146 K/UL — LOW (ref 150–400)
POTASSIUM SERPL-MCNC: 3.3 MMOL/L — LOW (ref 3.5–5.3)
POTASSIUM SERPL-SCNC: 3.3 MMOL/L — LOW (ref 3.5–5.3)
RBC # BLD: 3.78 M/UL — LOW (ref 3.8–5.2)
RBC # FLD: 16.1 % — HIGH (ref 10.3–14.5)
SODIUM SERPL-SCNC: 138 MMOL/L — SIGNIFICANT CHANGE UP (ref 135–145)
WBC # BLD: 10.4 K/UL — SIGNIFICANT CHANGE UP (ref 3.8–10.5)
WBC # FLD AUTO: 10.4 K/UL — SIGNIFICANT CHANGE UP (ref 3.8–10.5)

## 2020-09-24 PROCEDURE — 99233 SBSQ HOSP IP/OBS HIGH 50: CPT | Mod: GC

## 2020-09-24 PROCEDURE — 74019 RADEX ABDOMEN 2 VIEWS: CPT | Mod: 26

## 2020-09-24 RX ORDER — POTASSIUM CHLORIDE 20 MEQ
40 PACKET (EA) ORAL ONCE
Refills: 0 | Status: COMPLETED | OUTPATIENT
Start: 2020-09-24 | End: 2020-09-24

## 2020-09-24 RX ORDER — ACETAMINOPHEN 500 MG
1000 TABLET ORAL ONCE
Refills: 0 | Status: DISCONTINUED | OUTPATIENT
Start: 2020-09-24 | End: 2020-09-29

## 2020-09-24 RX ORDER — HYDROCHLOROTHIAZIDE 25 MG
25 TABLET ORAL DAILY
Refills: 0 | Status: DISCONTINUED | OUTPATIENT
Start: 2020-09-24 | End: 2020-09-29

## 2020-09-24 RX ORDER — POTASSIUM PHOSPHATE, MONOBASIC POTASSIUM PHOSPHATE, DIBASIC 236; 224 MG/ML; MG/ML
21 INJECTION, SOLUTION INTRAVENOUS ONCE
Refills: 0 | Status: COMPLETED | OUTPATIENT
Start: 2020-09-24 | End: 2020-09-24

## 2020-09-24 RX ADMIN — ONDANSETRON 4 MILLIGRAM(S): 8 TABLET, FILM COATED ORAL at 08:56

## 2020-09-24 RX ADMIN — PANTOPRAZOLE SODIUM 40 MILLIGRAM(S): 20 TABLET, DELAYED RELEASE ORAL at 06:10

## 2020-09-24 RX ADMIN — HEPARIN SODIUM 5000 UNIT(S): 5000 INJECTION INTRAVENOUS; SUBCUTANEOUS at 22:01

## 2020-09-24 RX ADMIN — ZOLPIDEM TARTRATE 5 MILLIGRAM(S): 10 TABLET ORAL at 22:02

## 2020-09-24 RX ADMIN — Medication 100 MILLIGRAM(S): at 06:10

## 2020-09-24 RX ADMIN — ZOLPIDEM TARTRATE 5 MILLIGRAM(S): 10 TABLET ORAL at 02:33

## 2020-09-24 RX ADMIN — OXYCODONE HYDROCHLORIDE 10 MILLIGRAM(S): 5 TABLET ORAL at 04:15

## 2020-09-24 RX ADMIN — Medication 40 MILLIEQUIVALENT(S): at 08:56

## 2020-09-24 RX ADMIN — Medication 100 MILLIGRAM(S): at 22:02

## 2020-09-24 RX ADMIN — Medication 3 MILLIGRAM(S): at 22:02

## 2020-09-24 RX ADMIN — Medication 60 MILLIGRAM(S): at 11:31

## 2020-09-24 RX ADMIN — HEPARIN SODIUM 5000 UNIT(S): 5000 INJECTION INTRAVENOUS; SUBCUTANEOUS at 14:02

## 2020-09-24 RX ADMIN — CEFTRIAXONE 100 MILLIGRAM(S): 500 INJECTION, POWDER, FOR SOLUTION INTRAMUSCULAR; INTRAVENOUS at 16:05

## 2020-09-24 RX ADMIN — Medication 100 MILLIGRAM(S): at 14:01

## 2020-09-24 RX ADMIN — HEPARIN SODIUM 5000 UNIT(S): 5000 INJECTION INTRAVENOUS; SUBCUTANEOUS at 06:10

## 2020-09-24 RX ADMIN — OXYCODONE HYDROCHLORIDE 10 MILLIGRAM(S): 5 TABLET ORAL at 19:24

## 2020-09-24 RX ADMIN — OXYCODONE HYDROCHLORIDE 10 MILLIGRAM(S): 5 TABLET ORAL at 18:24

## 2020-09-24 RX ADMIN — POTASSIUM PHOSPHATE, MONOBASIC POTASSIUM PHOSPHATE, DIBASIC 62.5 MILLIMOLE(S): 236; 224 INJECTION, SOLUTION INTRAVENOUS at 09:32

## 2020-09-24 RX ADMIN — ZOLPIDEM TARTRATE 5 MILLIGRAM(S): 10 TABLET ORAL at 22:59

## 2020-09-24 RX ADMIN — OXYCODONE HYDROCHLORIDE 5 MILLIGRAM(S): 5 TABLET ORAL at 11:31

## 2020-09-24 RX ADMIN — OXYCODONE HYDROCHLORIDE 10 MILLIGRAM(S): 5 TABLET ORAL at 04:45

## 2020-09-24 RX ADMIN — OXYCODONE HYDROCHLORIDE 5 MILLIGRAM(S): 5 TABLET ORAL at 12:38

## 2020-09-24 NOTE — PROGRESS NOTE ADULT - SUBJECTIVE AND OBJECTIVE BOX
O/N and interval events: None    Subjective:  Pt has no acute complaints. Denies skyler pain. Denies SOB/CP. Slight headache. ROS is otherwise negative.     Allergies    ACE inhibitors (Unknown)  Compazine (Unknown)    Intolerances    Home meds: Prozac, HCTZ 25 mg PO qd    MEDICATIONS  (STANDING):  acetaminophen  IVPB .. 1000 milliGRAM(s) IV Intermittent once  cefTRIAXone   IVPB 1000 milliGRAM(s) IV Intermittent every 24 hours  FLUoxetine 60 milliGRAM(s) Oral daily  heparin   Injectable 5000 Unit(s) SubCutaneous every 8 hours  hydrochlorothiazide 25 milliGRAM(s) Oral daily  lactated ringers. 1000 milliLiter(s) (75 mL/Hr) IV Continuous <Continuous>  melatonin 3 milliGRAM(s) Oral at bedtime  metroNIDAZOLE  IVPB      metroNIDAZOLE  IVPB 500 milliGRAM(s) IV Intermittent every 8 hours  pantoprazole    Tablet 40 milliGRAM(s) Oral before breakfast    MEDICATIONS  (PRN):  acetaminophen   Tablet .. 650 milliGRAM(s) Oral every 6 hours PRN Mild Pain (1 - 3)  ondansetron Injectable 4 milliGRAM(s) IV Push every 6 hours PRN Nausea  oxyCODONE    IR 5 milliGRAM(s) Oral every 4 hours PRN Moderate Pain (4 - 6)  oxyCODONE    IR 10 milliGRAM(s) Oral every 6 hours PRN Severe Pain (7 - 10)  zolpidem 5 milliGRAM(s) Oral at bedtime PRN Insomnia  zolpidem 5 milliGRAM(s) Oral at bedtime PRN Insomnia          Drug Dosing Weight  Height (cm): 172.7 (21 Sep 2020 10:30)  Weight (kg): 91.6 (21 Sep 2020 10:30)  BMI (kg/m2): 30.7 (21 Sep 2020 10:30)  BSA (m2): 2.05 (21 Sep 2020 10:30)    PAST MEDICAL & SURGICAL HISTORY:  GERD (gastroesophageal reflux disease)    Depression    HTN (hypertension)    History of Nissen fundoplication        FAMILY HISTORY:  no reported cardiac history reported    SOCIAL HISTORY:  social EtOH use, no smoking, no drug use    Vital Signs Last 24 Hrs  T(C): 37.1 (24 Sep 2020 16:25), Max: 37.3 (24 Sep 2020 14:28)  T(F): 98.7 (24 Sep 2020 16:25), Max: 99.2 (24 Sep 2020 14:28)  HR: 74 (24 Sep 2020 16:25) (71 - 83)  BP: 135/88 (24 Sep 2020 16:25) (111/65 - 157/90)  BP(mean): --  RR: 17 (24 Sep 2020 16:25) (17 - 18)  SpO2: 96% (24 Sep 2020 16:25) (94% - 98%)          PHYSICAL EXAM:      Constitutional: NAD  Eyes: PERRLA  ENMT: MMM  Neck: supple  Back: midline  Respiratory: CTA b/l  Cardiovascular: rrr, s1s2, no m/r/g  Gastrointestinal: soft, distended, mild TTP, RAN drain +  Extremities: wwp  Vascular: + 2 pulses radial  Neurological: AAO x 4  Skin: no rash  Lymph Nodes: no LAD  Musculoskeletal: no joint swelling  Psychiatric: normal affect    LABS:                                     10.3   10.40 )-----------( 146      ( 24 Sep 2020 05:57 )             32.1   09-24    138  |  100  |  4<L>  ----------------------------<  121<H>  3.3<L>   |  28  |  0.82    Ca    8.5      24 Sep 2020 05:57  Phos  1.6     09-24  Mg     2.0     09-24            EKG:    ECHO, US:    RADIOLOGY:

## 2020-09-24 NOTE — CHART NOTE - NSCHARTNOTEFT_GEN_A_CORE
Admitting Diagnosis:   Patient is a 70y old  Female who presents with a chief complaint of abdominal pain (23 Sep 2020 13:42)    Consult: Yes [   ]  No [ x  ]    Reason for Initial Nutrition Assessment: LOS Nutrition Assessment     PAST MEDICAL & SURGICAL HISTORY:  GERD (gastroesophageal reflux disease)  Depression  HTN (hypertension)  History of Nissen fundoplication    Current Nutrition Order: CLD    PO Intake: Good (%) [   ]  Fair (50-75%) [   ] Poor (<25%) [ x  ]- Pt taking sips throughout the day    GI Issues:   WDL, last BM 9/23  +abd distention, pt with nausea s/p pain medication  No v/d/c noted    Pain:  7/10 abd pain noted- well controlled with pain regime    Skin Integrity:  Surgical incision, elyse score 20  No edema present  No pressure ulcers present    Labs:   09-24    138  |  100  |  4<L>  ----------------------------<  121<H>  3.3<L>   |  28  |  0.82    Ca    8.5      24 Sep 2020 05:57  Phos  1.6     09-24  Mg     2.0     09-24    Nutritionally Pertinent Lab Values:    Medications:  MEDICATIONS  (STANDING):  acetaminophen  IVPB .. 1000 milliGRAM(s) IV Intermittent once  cefTRIAXone   IVPB 1000 milliGRAM(s) IV Intermittent every 24 hours  FLUoxetine 60 milliGRAM(s) Oral daily  heparin   Injectable 5000 Unit(s) SubCutaneous every 8 hours  hydrochlorothiazide 25 milliGRAM(s) Oral daily  lactated ringers. 1000 milliLiter(s) (75 mL/Hr) IV Continuous <Continuous>  melatonin 3 milliGRAM(s) Oral at bedtime  metroNIDAZOLE  IVPB      metroNIDAZOLE  IVPB 500 milliGRAM(s) IV Intermittent every 8 hours  pantoprazole    Tablet 40 milliGRAM(s) Oral before breakfast    MEDICATIONS  (PRN):  acetaminophen   Tablet .. 650 milliGRAM(s) Oral every 6 hours PRN Mild Pain (1 - 3)  ondansetron Injectable 4 milliGRAM(s) IV Push every 6 hours PRN Nausea  oxyCODONE    IR 5 milliGRAM(s) Oral every 4 hours PRN Moderate Pain (4 - 6)  oxyCODONE    IR 10 milliGRAM(s) Oral every 6 hours PRN Severe Pain (7 - 10)  zolpidem 5 milliGRAM(s) Oral at bedtime PRN Insomnia  zolpidem 5 milliGRAM(s) Oral at bedtime PRN Insomnia    Admitted Anthropometrics:  Height: 68" Weight: 202lbs, IBW 140lbs+/-10%, %%, BMI 30.7 kg/m2    Weight:  9/21 202lbs    Weight Change: No new weights obtained this admission. Please cont to trend weights weekly.     Nutrition Focused Physical Exam: Completed [   ]  Unable to complete [   ]- Not pertinent    Estimated energy needs:   IBW (64kg) used for calculations as pt >120% of IBW. Needs adjusted for wound healing and advanced age.   Kcal (20-25 kcal/kg): 7077-7427 kcal  Protein (1.2-1.4 g/kg pro): 77-90 g pro  Fluids (30-35 ml/kg): 1103-2363 ml    Subjective:   70F w/ PMHx of HTN, depression, GERD (s/p laparoscopic Nissen fundoplication in 2012 at [place]) presents with sharp abdominal pain following outpatient colonoscopy this morning. Afebrile, HDS. Clinical picture with concern for perforation now s/p diagnostic laparoscopy, YONATAN, repair of sigmoid colon primarily 9/21. On assessment, pt is resting in bed without complaints at this time. Pt noting that pain is currently well controlled with pain regime. Currently on CLD tolerating 25-50% of meals. Pt noting she requires pain and nausea medication p/t meals. No v/d/c noted. Education provided on likely diet advancement and low fiber diet guidelines- pt receptive and edu handout left at bedside. Pt noting good PO intake PTA. Weight remained consistent PTA, denies weight loss. NKFA. Please see nutrition recs below. RD to follow up per protocol.     Nutrition Diagnosis: Inadequate energy intake RT inability to meet est needs on current diet AEB meeting <50% est needs on CLD    [  ] No active nutrition diagnosis at this time  [  ] Current medical condition precludes nutrition intervention    Goal: 1. Diet will advance within 24-48hrs 2. Consistently meet >75% est needs    Recommendations:  1. CLD  >> Recommend advance to low fiber diet when medically feasible  2. Cont with pain and bowel regime per team  3. Cont with zofran prn to aid with nausea  4. Monitor lytes and replete prn, cont with adequate hydration per team  5. RD diet education reenforcement prn    Education: Education provided on likely diet advancement and low fiber diet guidelines- pt receptive and edu handout left at bedside.    Risk Level: High [ x  ] Moderate [   ] Low [   ]

## 2020-09-24 NOTE — PROGRESS NOTE ADULT - SUBJECTIVE AND OBJECTIVE BOX
24 hr events:  ON: Joselyn sips of CLD, some nausea w/o emesis, + flatus/ - BM, ASHLI. VSS.   9/23: adv to CLD, switched to cef/flagyl, ambulated x1. stepped down to regional.     SUBJECTIVE:  Pt seen and examined by chief resident. Pt is doing well, resting comfortably on bed. Pain controlled. Clear liquid diet tolerated. Ambulating out of bed. +F/+BM. No nausea or vomiting. No complaints at this time.    Vital Signs Last 24 Hrs  T(C): 36.8 (24 Sep 2020 05:15), Max: 37.7 (23 Sep 2020 18:00)  T(F): 98.3 (24 Sep 2020 05:15), Max: 99.8 (23 Sep 2020 18:00)  HR: 71 (24 Sep 2020 05:15) (71 - 83)  BP: 150/86 (24 Sep 2020 05:15) (111/65 - 150/86)  BP(mean): 110 (23 Sep 2020 16:56) (110 - 110)  RR: 18 (24 Sep 2020 05:15) (18 - 18)  SpO2: 98% (24 Sep 2020 05:15) (93% - 98%)    Physical Exam:  General: NAD  Pulmonary: Nonlabored breathing, no respiratory distress  Cardiovascular: NSR  Abdominal: soft, NT/ND, no rebound or guarding. Incisions clean dry and intact. RAN serosanguinous but more serous.   Extremities: WWP, SCDs in place    I&O's Summary    23 Sep 2020 07:01  -  24 Sep 2020 07:00  --------------------------------------------------------  IN: 1240 mL / OUT: 2095 mL / NET: -855 mL        LABS:                        10.3   10.40 )-----------( 146      ( 24 Sep 2020 05:57 )             32.1     09-24    138  |  100  |  4<L>  ----------------------------<  121<H>  3.3<L>   |  28  |  0.82    Ca    8.5      24 Sep 2020 05:57  Phos  1.6     09-24  Mg     2.0     09-24       24 hr events:  ON: Joselyn sips of CLD, some nausea w/o emesis, + flatus/ - BM, ASHLI. VSS.   9/23: adv to CLD, switched to cef/flagyl, ambulated x1. stepped down to regional.     SUBJECTIVE:  Pt seen and examined by chief resident. Pt is doing well, resting comfortably on bed. Pain controlled. Clear liquid diet tolerated. Ambulating out of bed. +F/+BM. No nausea or vomiting. No complaints at this time.    Vital Signs Last 24 Hrs  T(C): 36.8 (24 Sep 2020 05:15), Max: 37.7 (23 Sep 2020 18:00)  T(F): 98.3 (24 Sep 2020 05:15), Max: 99.8 (23 Sep 2020 18:00)  HR: 71 (24 Sep 2020 05:15) (71 - 83)  BP: 150/86 (24 Sep 2020 05:15) (111/65 - 150/86)  BP(mean): 110 (23 Sep 2020 16:56) (110 - 110)  RR: 18 (24 Sep 2020 05:15) (18 - 18)  SpO2: 98% (24 Sep 2020 05:15) (93% - 98%)    Physical Exam:  General: NAD  Pulmonary: Nonlabored breathing, no respiratory distress  Cardiovascular: NSR  Abdominal: soft, distended, nontender, no rebound or guarding. Incisions clean dry and intact. RAN serosanguinous but more serous.   Extremities: WWP, SCDs in place    I&O's Summary    23 Sep 2020 07:01  -  24 Sep 2020 07:00  --------------------------------------------------------  IN: 1240 mL / OUT: 2095 mL / NET: -855 mL        LABS:                        10.3   10.40 )-----------( 146      ( 24 Sep 2020 05:57 )             32.1     09-24    138  |  100  |  4<L>  ----------------------------<  121<H>  3.3<L>   |  28  |  0.82    Ca    8.5      24 Sep 2020 05:57  Phos  1.6     09-24  Mg     2.0     09-24

## 2020-09-25 LAB
ANION GAP SERPL CALC-SCNC: 12 MMOL/L — SIGNIFICANT CHANGE UP (ref 5–17)
BUN SERPL-MCNC: 5 MG/DL — LOW (ref 7–23)
CALCIUM SERPL-MCNC: 9.1 MG/DL — SIGNIFICANT CHANGE UP (ref 8.4–10.5)
CHLORIDE SERPL-SCNC: 99 MMOL/L — SIGNIFICANT CHANGE UP (ref 96–108)
CO2 SERPL-SCNC: 27 MMOL/L — SIGNIFICANT CHANGE UP (ref 22–31)
CREAT SERPL-MCNC: 0.97 MG/DL — SIGNIFICANT CHANGE UP (ref 0.5–1.3)
GLUCOSE SERPL-MCNC: 114 MG/DL — HIGH (ref 70–99)
HCT VFR BLD CALC: 34 % — LOW (ref 34.5–45)
HGB BLD-MCNC: 11 G/DL — LOW (ref 11.5–15.5)
MAGNESIUM SERPL-MCNC: 1.8 MG/DL — SIGNIFICANT CHANGE UP (ref 1.6–2.6)
MCHC RBC-ENTMCNC: 27.2 PG — SIGNIFICANT CHANGE UP (ref 27–34)
MCHC RBC-ENTMCNC: 32.4 GM/DL — SIGNIFICANT CHANGE UP (ref 32–36)
MCV RBC AUTO: 84.2 FL — SIGNIFICANT CHANGE UP (ref 80–100)
NRBC # BLD: 0 /100 WBCS — SIGNIFICANT CHANGE UP (ref 0–0)
PHOSPHATE SERPL-MCNC: 2.5 MG/DL — SIGNIFICANT CHANGE UP (ref 2.5–4.5)
PLATELET # BLD AUTO: 176 K/UL — SIGNIFICANT CHANGE UP (ref 150–400)
POTASSIUM SERPL-MCNC: 3.4 MMOL/L — LOW (ref 3.5–5.3)
POTASSIUM SERPL-SCNC: 3.4 MMOL/L — LOW (ref 3.5–5.3)
RBC # BLD: 4.04 M/UL — SIGNIFICANT CHANGE UP (ref 3.8–5.2)
RBC # FLD: 15.9 % — HIGH (ref 10.3–14.5)
SODIUM SERPL-SCNC: 138 MMOL/L — SIGNIFICANT CHANGE UP (ref 135–145)
WBC # BLD: 10.18 K/UL — SIGNIFICANT CHANGE UP (ref 3.8–10.5)
WBC # FLD AUTO: 10.18 K/UL — SIGNIFICANT CHANGE UP (ref 3.8–10.5)

## 2020-09-25 PROCEDURE — 99233 SBSQ HOSP IP/OBS HIGH 50: CPT | Mod: GC

## 2020-09-25 RX ORDER — POTASSIUM CHLORIDE 20 MEQ
40 PACKET (EA) ORAL ONCE
Refills: 0 | Status: COMPLETED | OUTPATIENT
Start: 2020-09-25 | End: 2020-09-25

## 2020-09-25 RX ORDER — MAGNESIUM SULFATE 500 MG/ML
1 VIAL (ML) INJECTION ONCE
Refills: 0 | Status: COMPLETED | OUTPATIENT
Start: 2020-09-25 | End: 2020-09-25

## 2020-09-25 RX ORDER — ZOLPIDEM TARTRATE 10 MG/1
10 TABLET ORAL AT BEDTIME
Refills: 0 | Status: DISCONTINUED | OUTPATIENT
Start: 2020-09-25 | End: 2020-09-29

## 2020-09-25 RX ORDER — POTASSIUM CHLORIDE 20 MEQ
20 PACKET (EA) ORAL ONCE
Refills: 0 | Status: COMPLETED | OUTPATIENT
Start: 2020-09-25 | End: 2020-09-25

## 2020-09-25 RX ADMIN — Medication 25 MILLIGRAM(S): at 05:42

## 2020-09-25 RX ADMIN — Medication 100 MILLIGRAM(S): at 22:49

## 2020-09-25 RX ADMIN — ONDANSETRON 4 MILLIGRAM(S): 8 TABLET, FILM COATED ORAL at 10:43

## 2020-09-25 RX ADMIN — Medication 100 MILLIGRAM(S): at 15:45

## 2020-09-25 RX ADMIN — OXYCODONE HYDROCHLORIDE 5 MILLIGRAM(S): 5 TABLET ORAL at 15:56

## 2020-09-25 RX ADMIN — ZOLPIDEM TARTRATE 10 MILLIGRAM(S): 10 TABLET ORAL at 22:48

## 2020-09-25 RX ADMIN — HEPARIN SODIUM 5000 UNIT(S): 5000 INJECTION INTRAVENOUS; SUBCUTANEOUS at 14:48

## 2020-09-25 RX ADMIN — HEPARIN SODIUM 5000 UNIT(S): 5000 INJECTION INTRAVENOUS; SUBCUTANEOUS at 05:42

## 2020-09-25 RX ADMIN — Medication 60 MILLIGRAM(S): at 11:14

## 2020-09-25 RX ADMIN — Medication 100 MILLIGRAM(S): at 05:42

## 2020-09-25 RX ADMIN — CEFTRIAXONE 100 MILLIGRAM(S): 500 INJECTION, POWDER, FOR SOLUTION INTRAMUSCULAR; INTRAVENOUS at 17:50

## 2020-09-25 RX ADMIN — Medication 100 GRAM(S): at 10:43

## 2020-09-25 RX ADMIN — PANTOPRAZOLE SODIUM 40 MILLIGRAM(S): 20 TABLET, DELAYED RELEASE ORAL at 05:42

## 2020-09-25 RX ADMIN — Medication 20 MILLIEQUIVALENT(S): at 14:48

## 2020-09-25 RX ADMIN — OXYCODONE HYDROCHLORIDE 5 MILLIGRAM(S): 5 TABLET ORAL at 19:43

## 2020-09-25 RX ADMIN — OXYCODONE HYDROCHLORIDE 5 MILLIGRAM(S): 5 TABLET ORAL at 14:52

## 2020-09-25 RX ADMIN — Medication 40 MILLIEQUIVALENT(S): at 10:43

## 2020-09-25 RX ADMIN — HEPARIN SODIUM 5000 UNIT(S): 5000 INJECTION INTRAVENOUS; SUBCUTANEOUS at 22:49

## 2020-09-25 RX ADMIN — ONDANSETRON 4 MILLIGRAM(S): 8 TABLET, FILM COATED ORAL at 19:44

## 2020-09-25 NOTE — PROGRESS NOTE ADULT - SUBJECTIVE AND OBJECTIVE BOX
24 hr events:  ON: denies N/V. + BF. RAN x1 SS. OOBA. remains distended + ttp, soft.   9/24: started on home meds including Hctz, AXR nonspecific gas pattern. +BM    SUBJECTIVE:  Pt seen and examined by chief resident. Pt is doing well, resting comfortably on bed. Pain controlled. Tolerating liquids overnight. Ambulating out of bed. +F/+small BM. No nausea or vomiting. No complaints at this time.    Vital Signs Last 24 Hrs  T(C): 37.2 (25 Sep 2020 05:52), Max: 37.3 (24 Sep 2020 14:28)  T(F): 99 (25 Sep 2020 05:52), Max: 99.2 (24 Sep 2020 14:28)  HR: 70 (25 Sep 2020 05:52) (70 - 83)  BP: 130/67 (25 Sep 2020 05:52) (130/67 - 157/90)  BP(mean): --  RR: 17 (25 Sep 2020 05:52) (17 - 18)  SpO2: 95% (25 Sep 2020 05:52) (94% - 96%)    Physical Exam:  General: NAD  Pulmonary: Nonlabored breathing, no respiratory distress  Abdominal: softer today but still distended, nontender, no rebound or guarding. incisions clean dry and intact. , NT/ND, no organomegaly  Extremities: WWP, SCDs in place    I&O's Summary    24 Sep 2020 07:01  -  25 Sep 2020 07:00  --------------------------------------------------------  IN: 1700 mL / OUT: 1455 mL / NET: 245 mL        LABS:                        11.0   10.18 )-----------( 176      ( 25 Sep 2020 06:02 )             34.0     09-25    138  |  99  |  5<L>  ----------------------------<  114<H>  3.4<L>   |  27  |  0.97    Ca    9.1      25 Sep 2020 06:02  Phos  2.5     09-25  Mg     1.8     09-25

## 2020-09-25 NOTE — PROGRESS NOTE ADULT - SUBJECTIVE AND OBJECTIVE BOX
O/N and interval events: None    Subjective:  Pt has no acute complaints. Denies skyler pain. Denies SOB/CP. Slight headache. ROS is otherwise negative.     Allergies    ACE inhibitors (Unknown)  Compazine (Unknown)    Intolerances    Home meds: Prozac, HCTZ 25 mg PO qd    MEDICATIONS  (STANDING):  acetaminophen  IVPB .. 1000 milliGRAM(s) IV Intermittent once  cefTRIAXone   IVPB 1000 milliGRAM(s) IV Intermittent every 24 hours  FLUoxetine 60 milliGRAM(s) Oral daily  heparin   Injectable 5000 Unit(s) SubCutaneous every 8 hours  hydrochlorothiazide 25 milliGRAM(s) Oral daily  melatonin 3 milliGRAM(s) Oral at bedtime  metroNIDAZOLE  IVPB      metroNIDAZOLE  IVPB 500 milliGRAM(s) IV Intermittent every 8 hours  pantoprazole    Tablet 40 milliGRAM(s) Oral before breakfast    MEDICATIONS  (PRN):  acetaminophen   Tablet .. 650 milliGRAM(s) Oral every 6 hours PRN Mild Pain (1 - 3)  ondansetron Injectable 4 milliGRAM(s) IV Push every 6 hours PRN Nausea  oxyCODONE    IR 5 milliGRAM(s) Oral every 4 hours PRN Moderate Pain (4 - 6)  oxyCODONE    IR 10 milliGRAM(s) Oral every 6 hours PRN Severe Pain (7 - 10)  zolpidem 10 milliGRAM(s) Oral at bedtime PRN Insomnia            Drug Dosing Weight  Height (cm): 172.7 (21 Sep 2020 10:30)  Weight (kg): 91.6 (21 Sep 2020 10:30)  BMI (kg/m2): 30.7 (21 Sep 2020 10:30)  BSA (m2): 2.05 (21 Sep 2020 10:30)    PAST MEDICAL & SURGICAL HISTORY:  GERD (gastroesophageal reflux disease)    Depression    HTN (hypertension)    History of Nissen fundoplication        FAMILY HISTORY:  no reported cardiac history reported    SOCIAL HISTORY:  social EtOH use, no smoking, no drug use    Vital Signs Last 24 Hrs  T(C): 37.2 (25 Sep 2020 17:25), Max: 37.2 (25 Sep 2020 05:52)  T(F): 99 (25 Sep 2020 17:25), Max: 99 (25 Sep 2020 05:52)  HR: 66 (25 Sep 2020 17:25) (66 - 80)  BP: 136/77 (25 Sep 2020 17:25) (128/76 - 157/94)  BP(mean): --  RR: 18 (25 Sep 2020 17:25) (17 - 18)  SpO2: 97% (25 Sep 2020 17:25) (91% - 98%)      PHYSICAL EXAM:      Constitutional: NAD  Eyes: PERRLA  ENMT: MMM  Neck: supple  Back: midline  Respiratory: CTA b/l  Cardiovascular: rrr, s1s2, no m/r/g  Gastrointestinal: soft, distended, mild TTP, RAN drain +  Extremities: wwp  Vascular: + 2 pulses radial  Neurological: AAO x 4  Skin: no rash  Lymph Nodes: no LAD  Musculoskeletal: no joint swelling  Psychiatric: normal affect    LABS:                                     11.0   10.18 )-----------( 176      ( 25 Sep 2020 06:02 )             34.0   09-25    138  |  99  |  5<L>  ----------------------------<  114<H>  3.4<L>   |  27  |  0.97    Ca    9.1      25 Sep 2020 06:02  Phos  2.5     09-25  Mg     1.8     09-25              EKG:    ECHO, US:    RADIOLOGY:

## 2020-09-26 LAB
ANION GAP SERPL CALC-SCNC: 10 MMOL/L — SIGNIFICANT CHANGE UP (ref 5–17)
BUN SERPL-MCNC: 6 MG/DL — LOW (ref 7–23)
CALCIUM SERPL-MCNC: 9.3 MG/DL — SIGNIFICANT CHANGE UP (ref 8.4–10.5)
CHLORIDE SERPL-SCNC: 99 MMOL/L — SIGNIFICANT CHANGE UP (ref 96–108)
CO2 SERPL-SCNC: 28 MMOL/L — SIGNIFICANT CHANGE UP (ref 22–31)
CREAT SERPL-MCNC: 1.04 MG/DL — SIGNIFICANT CHANGE UP (ref 0.5–1.3)
GLUCOSE SERPL-MCNC: 117 MG/DL — HIGH (ref 70–99)
HCT VFR BLD CALC: 35.1 % — SIGNIFICANT CHANGE UP (ref 34.5–45)
HGB BLD-MCNC: 11.2 G/DL — LOW (ref 11.5–15.5)
MAGNESIUM SERPL-MCNC: 2 MG/DL — SIGNIFICANT CHANGE UP (ref 1.6–2.6)
MCHC RBC-ENTMCNC: 27.2 PG — SIGNIFICANT CHANGE UP (ref 27–34)
MCHC RBC-ENTMCNC: 31.9 GM/DL — LOW (ref 32–36)
MCV RBC AUTO: 85.2 FL — SIGNIFICANT CHANGE UP (ref 80–100)
NRBC # BLD: 0 /100 WBCS — SIGNIFICANT CHANGE UP (ref 0–0)
PHOSPHATE SERPL-MCNC: 3.1 MG/DL — SIGNIFICANT CHANGE UP (ref 2.5–4.5)
PLATELET # BLD AUTO: 192 K/UL — SIGNIFICANT CHANGE UP (ref 150–400)
POTASSIUM SERPL-MCNC: 3.9 MMOL/L — SIGNIFICANT CHANGE UP (ref 3.5–5.3)
POTASSIUM SERPL-SCNC: 3.9 MMOL/L — SIGNIFICANT CHANGE UP (ref 3.5–5.3)
RBC # BLD: 4.12 M/UL — SIGNIFICANT CHANGE UP (ref 3.8–5.2)
RBC # FLD: 15.9 % — HIGH (ref 10.3–14.5)
SODIUM SERPL-SCNC: 137 MMOL/L — SIGNIFICANT CHANGE UP (ref 135–145)
WBC # BLD: 9.21 K/UL — SIGNIFICANT CHANGE UP (ref 3.8–10.5)
WBC # FLD AUTO: 9.21 K/UL — SIGNIFICANT CHANGE UP (ref 3.8–10.5)

## 2020-09-26 PROCEDURE — 99233 SBSQ HOSP IP/OBS HIGH 50: CPT

## 2020-09-26 RX ORDER — AMLODIPINE BESYLATE 2.5 MG/1
5 TABLET ORAL DAILY
Refills: 0 | Status: DISCONTINUED | OUTPATIENT
Start: 2020-09-26 | End: 2020-09-29

## 2020-09-26 RX ORDER — POTASSIUM CHLORIDE 20 MEQ
20 PACKET (EA) ORAL ONCE
Refills: 0 | Status: COMPLETED | OUTPATIENT
Start: 2020-09-26 | End: 2020-09-26

## 2020-09-26 RX ADMIN — ONDANSETRON 4 MILLIGRAM(S): 8 TABLET, FILM COATED ORAL at 04:12

## 2020-09-26 RX ADMIN — ZOLPIDEM TARTRATE 10 MILLIGRAM(S): 10 TABLET ORAL at 22:10

## 2020-09-26 RX ADMIN — Medication 25 MILLIGRAM(S): at 05:10

## 2020-09-26 RX ADMIN — CEFTRIAXONE 100 MILLIGRAM(S): 500 INJECTION, POWDER, FOR SOLUTION INTRAMUSCULAR; INTRAVENOUS at 15:54

## 2020-09-26 RX ADMIN — Medication 20 MILLIEQUIVALENT(S): at 11:57

## 2020-09-26 RX ADMIN — Medication 100 MILLIGRAM(S): at 21:51

## 2020-09-26 RX ADMIN — ONDANSETRON 4 MILLIGRAM(S): 8 TABLET, FILM COATED ORAL at 22:34

## 2020-09-26 RX ADMIN — ONDANSETRON 4 MILLIGRAM(S): 8 TABLET, FILM COATED ORAL at 16:48

## 2020-09-26 RX ADMIN — OXYCODONE HYDROCHLORIDE 5 MILLIGRAM(S): 5 TABLET ORAL at 04:45

## 2020-09-26 RX ADMIN — PANTOPRAZOLE SODIUM 40 MILLIGRAM(S): 20 TABLET, DELAYED RELEASE ORAL at 06:27

## 2020-09-26 RX ADMIN — HEPARIN SODIUM 5000 UNIT(S): 5000 INJECTION INTRAVENOUS; SUBCUTANEOUS at 13:43

## 2020-09-26 RX ADMIN — ONDANSETRON 4 MILLIGRAM(S): 8 TABLET, FILM COATED ORAL at 10:37

## 2020-09-26 RX ADMIN — OXYCODONE HYDROCHLORIDE 5 MILLIGRAM(S): 5 TABLET ORAL at 16:25

## 2020-09-26 RX ADMIN — OXYCODONE HYDROCHLORIDE 5 MILLIGRAM(S): 5 TABLET ORAL at 15:30

## 2020-09-26 RX ADMIN — HEPARIN SODIUM 5000 UNIT(S): 5000 INJECTION INTRAVENOUS; SUBCUTANEOUS at 05:10

## 2020-09-26 RX ADMIN — Medication 3 MILLIGRAM(S): at 21:51

## 2020-09-26 RX ADMIN — Medication 100 MILLIGRAM(S): at 13:43

## 2020-09-26 RX ADMIN — Medication 60 MILLIGRAM(S): at 11:58

## 2020-09-26 RX ADMIN — Medication 100 MILLIGRAM(S): at 05:10

## 2020-09-26 RX ADMIN — OXYCODONE HYDROCHLORIDE 5 MILLIGRAM(S): 5 TABLET ORAL at 04:13

## 2020-09-26 NOTE — PROGRESS NOTE ADULT - SUBJECTIVE AND OBJECTIVE BOX
HX: POD  s/p      SUBJECTIVE: Patient seen and examined bedside by chief resident.    cefTRIAXone   IVPB 1000 milliGRAM(s) IV Intermittent every 24 hours  heparin   Injectable 5000 Unit(s) SubCutaneous every 8 hours  hydrochlorothiazide 25 milliGRAM(s) Oral daily  metroNIDAZOLE  IVPB      metroNIDAZOLE  IVPB 500 milliGRAM(s) IV Intermittent every 8 hours    MEDICATIONS  (PRN):  acetaminophen   Tablet .. 650 milliGRAM(s) Oral every 6 hours PRN Mild Pain (1 - 3)  ondansetron Injectable 4 milliGRAM(s) IV Push every 6 hours PRN Nausea  oxyCODONE    IR 5 milliGRAM(s) Oral every 4 hours PRN Moderate Pain (4 - 6)  oxyCODONE    IR 10 milliGRAM(s) Oral every 6 hours PRN Severe Pain (7 - 10)  zolpidem 10 milliGRAM(s) Oral at bedtime PRN Insomnia      I&O's Detail    25 Sep 2020 07:01  -  26 Sep 2020 07:00  --------------------------------------------------------  IN:    IV PiggyBack: 100 mL    Lactated Ringers: 225 mL    Oral Fluid: 360 mL  Total IN: 685 mL    OUT:    Bulb (mL): 25 mL    Voided (mL): 650 mL  Total OUT: 675 mL    Total NET: 10 mL      26 Sep 2020 07:01  -  26 Sep 2020 12:43  --------------------------------------------------------  IN:    Oral Fluid: 350 mL  Total IN: 350 mL    OUT:    Voided (mL): 350 mL  Total OUT: 350 mL    Total NET: 0 mL          Vital Signs Last 24 Hrs  T(C): 36.9 (26 Sep 2020 09:56), Max: 37.2 (25 Sep 2020 17:25)  T(F): 98.4 (26 Sep 2020 09:56), Max: 99 (25 Sep 2020 17:25)  HR: 71 (26 Sep 2020 09:56) (66 - 80)  BP: 141/79 (26 Sep 2020 09:56) (136/77 - 157/94)  BP(mean): --  RR: 19 (26 Sep 2020 09:56) (17 - 19)  SpO2: 96% (26 Sep 2020 09:56) (91% - 98%)    General: NAD, resting comfortably in bed  C/V: pulses present in b/l upper extremities   Pulm: Nonlabored breathing, no respiratory distress  Abd: soft, ND, NT, no rebound or guarding,   Extrem: WWP, no edema, SCDs in place    LABS:                        11.2   9.21  )-----------( 192      ( 26 Sep 2020 07:38 )             35.1     09-26    137  |  99  |  6<L>  ----------------------------<  117<H>  3.9   |  28  |  1.04    Ca    9.3      26 Sep 2020 07:38  Phos  3.1     09-26  Mg     2.0     09-26            RADIOLOGY & ADDITIONAL STUDIES:        Plan:   Diet:   IVF:   Abx:   Pain/Nausea:   Home medications:   AM labs       HX: POD s/p diagnostic laparoscopy, YONATAN, repair of sigmoid colon primarily, colonoscopy, methylene blue leak test     SUBJECTIVE: Patient seen and examined bedside by chief resident. Endorses mild nausea and one episode of emesis overnight. Endorses gas, no BMs. On ceftriaxone or flagyl. Ambulating well.     SUBJECTIVE: Patient seen and examined bedside by chief resident.    I&O's Detail    25 Sep 2020 07:01  -  26 Sep 2020 07:00  --------------------------------------------------------  IN:    IV PiggyBack: 100 mL    Lactated Ringers: 225 mL    Oral Fluid: 360 mL  Total IN: 685 mL    OUT:    Bulb (mL): 25 mL    Voided (mL): 650 mL  Total OUT: 675 mL    Total NET: 10 mL      26 Sep 2020 07:01  -  26 Sep 2020 12:43  --------------------------------------------------------  IN:    Oral Fluid: 350 mL  Total IN: 350 mL    OUT:    Voided (mL): 350 mL  Total OUT: 350 mL    Total NET: 0 mL    Vital Signs Last 24 Hrs  T(C): 36.9 (26 Sep 2020 09:56), Max: 37.2 (25 Sep 2020 17:25)  T(F): 98.4 (26 Sep 2020 09:56), Max: 99 (25 Sep 2020 17:25)  HR: 71 (26 Sep 2020 09:56) (66 - 80)  BP: 141/79 (26 Sep 2020 09:56) (136/77 - 157/94)  RR: 19 (26 Sep 2020 09:56) (17 - 19)  SpO2: 96% (26 Sep 2020 09:56) (91% - 98%)    General: NAD, resting comfortably in bed  C/V: pulses present in b/l upper extremities   Pulm: Nonlabored breathing, no respiratory distress  Abd: soft, moderately distended, improved since yesterday, NT, no rebound or guarding, incisions CDI   Extrem: WWP, no edema, SCDs in place    LABS:                        11.2   9.21  )-----------( 192      ( 26 Sep 2020 07:38 )             35.1     09-26    137  |  99  |  6<L>  ----------------------------<  117<H>  3.9   |  28  |  1.04    Ca    9.3      26 Sep 2020 07:38  Phos  3.1     09-26  Mg     2.0     09-26

## 2020-09-26 NOTE — PROGRESS NOTE ADULT - SUBJECTIVE AND OBJECTIVE BOX
INTERNAL MEDICINE PROGRESS NOTE  Patient seen and examined at bedside. Patient feels she is overall improving. Still has occasional abdominal pain similar to that she was experiencing before her colonoscopy. Denies fevers, chills.     PERTINENT REVIEW OF SYSTEMS:  CONSTITUTIONAL: No weakness, fevers or chills  HEENT: No visual changes; No vertigo or throat pain   GASTROINTESTINAL: As above  NEUROLOGICAL: No numbness or weakness  SKIN: No itching, burning, rashes, or lesions     Allergies    ACE inhibitors (Unknown)  Compazine (Unknown)    Intolerances      MEDICATIONS:  MEDICATIONS  (STANDING):  acetaminophen  IVPB .. 1000 milliGRAM(s) IV Intermittent once  cefTRIAXone   IVPB 1000 milliGRAM(s) IV Intermittent every 24 hours  FLUoxetine 60 milliGRAM(s) Oral daily  heparin   Injectable 5000 Unit(s) SubCutaneous every 8 hours  hydrochlorothiazide 25 milliGRAM(s) Oral daily  melatonin 3 milliGRAM(s) Oral at bedtime  metroNIDAZOLE  IVPB      metroNIDAZOLE  IVPB 500 milliGRAM(s) IV Intermittent every 8 hours  pantoprazole    Tablet 40 milliGRAM(s) Oral before breakfast    MEDICATIONS  (PRN):  acetaminophen   Tablet .. 650 milliGRAM(s) Oral every 6 hours PRN Mild Pain (1 - 3)  ondansetron Injectable 4 milliGRAM(s) IV Push every 6 hours PRN Nausea  oxyCODONE    IR 5 milliGRAM(s) Oral every 4 hours PRN Moderate Pain (4 - 6)  oxyCODONE    IR 10 milliGRAM(s) Oral every 6 hours PRN Severe Pain (7 - 10)  zolpidem 10 milliGRAM(s) Oral at bedtime PRN Insomnia    Vital Signs Last 24 Hrs  T(C): 36.9 (26 Sep 2020 09:56), Max: 37.2 (25 Sep 2020 17:25)  T(F): 98.4 (26 Sep 2020 09:56), Max: 99 (25 Sep 2020 17:25)  HR: 71 (26 Sep 2020 09:56) (66 - 80)  BP: 141/79 (26 Sep 2020 09:56) (136/77 - 157/94)  BP(mean): --  RR: 19 (26 Sep 2020 09:56) (17 - 19)  SpO2: 96% (26 Sep 2020 09:56) (91% - 98%)    09-25 @ 07:01  -  09-26 @ 07:00  --------------------------------------------------------  IN: 685 mL / OUT: 675 mL / NET: 10 mL    09-26 @ 07:01  -  09-26 @ 13:57  --------------------------------------------------------  IN: 350 mL / OUT: 350 mL / NET: 0 mL      PHYSICAL EXAM:    General: Well developed; well nourished; in no acute distress  HEENT: MMM, conjunctiva and sclera clear  Gastrointestinal: Well-healing incision sites without erythema or drainage; periumbilical incision with ecchymosis  Skin: Warm and dry. No obvious rash    LABS:                        11.2   9.21  )-----------( 192      ( 26 Sep 2020 07:38 )             35.1     09-26    137  |  99  |  6<L>  ----------------------------<  117<H>  3.9   |  28  |  1.04    Ca    9.3      26 Sep 2020 07:38  Phos  3.1     09-26  Mg     2.0     09-26                        RADIOLOGY & ADDITIONAL STUDIES:  Reviewed

## 2020-09-27 LAB
ANION GAP SERPL CALC-SCNC: 15 MMOL/L — SIGNIFICANT CHANGE UP (ref 5–17)
BUN SERPL-MCNC: 7 MG/DL — SIGNIFICANT CHANGE UP (ref 7–23)
CALCIUM SERPL-MCNC: 9.4 MG/DL — SIGNIFICANT CHANGE UP (ref 8.4–10.5)
CHLORIDE SERPL-SCNC: 97 MMOL/L — SIGNIFICANT CHANGE UP (ref 96–108)
CO2 SERPL-SCNC: 26 MMOL/L — SIGNIFICANT CHANGE UP (ref 22–31)
CREAT SERPL-MCNC: 1.09 MG/DL — SIGNIFICANT CHANGE UP (ref 0.5–1.3)
GLUCOSE SERPL-MCNC: 112 MG/DL — HIGH (ref 70–99)
HCT VFR BLD CALC: 36.6 % — SIGNIFICANT CHANGE UP (ref 34.5–45)
HGB BLD-MCNC: 12 G/DL — SIGNIFICANT CHANGE UP (ref 11.5–15.5)
MAGNESIUM SERPL-MCNC: 1.9 MG/DL — SIGNIFICANT CHANGE UP (ref 1.6–2.6)
MCHC RBC-ENTMCNC: 27.7 PG — SIGNIFICANT CHANGE UP (ref 27–34)
MCHC RBC-ENTMCNC: 32.8 GM/DL — SIGNIFICANT CHANGE UP (ref 32–36)
MCV RBC AUTO: 84.5 FL — SIGNIFICANT CHANGE UP (ref 80–100)
NRBC # BLD: 0 /100 WBCS — SIGNIFICANT CHANGE UP (ref 0–0)
PHOSPHATE SERPL-MCNC: 3.5 MG/DL — SIGNIFICANT CHANGE UP (ref 2.5–4.5)
PLATELET # BLD AUTO: 218 K/UL — SIGNIFICANT CHANGE UP (ref 150–400)
POTASSIUM SERPL-MCNC: 3.7 MMOL/L — SIGNIFICANT CHANGE UP (ref 3.5–5.3)
POTASSIUM SERPL-SCNC: 3.7 MMOL/L — SIGNIFICANT CHANGE UP (ref 3.5–5.3)
RBC # BLD: 4.33 M/UL — SIGNIFICANT CHANGE UP (ref 3.8–5.2)
RBC # FLD: 15.9 % — HIGH (ref 10.3–14.5)
SODIUM SERPL-SCNC: 138 MMOL/L — SIGNIFICANT CHANGE UP (ref 135–145)
WBC # BLD: 9.44 K/UL — SIGNIFICANT CHANGE UP (ref 3.8–10.5)
WBC # FLD AUTO: 9.44 K/UL — SIGNIFICANT CHANGE UP (ref 3.8–10.5)

## 2020-09-27 PROCEDURE — 99233 SBSQ HOSP IP/OBS HIGH 50: CPT

## 2020-09-27 RX ORDER — SCOPALAMINE 1 MG/3D
1 PATCH, EXTENDED RELEASE TRANSDERMAL ONCE
Refills: 0 | Status: COMPLETED | OUTPATIENT
Start: 2020-09-27 | End: 2020-09-27

## 2020-09-27 RX ORDER — SENNA PLUS 8.6 MG/1
2 TABLET ORAL DAILY
Refills: 0 | Status: DISCONTINUED | OUTPATIENT
Start: 2020-09-27 | End: 2020-09-29

## 2020-09-27 RX ORDER — POTASSIUM CHLORIDE 20 MEQ
40 PACKET (EA) ORAL ONCE
Refills: 0 | Status: COMPLETED | OUTPATIENT
Start: 2020-09-27 | End: 2020-09-27

## 2020-09-27 RX ORDER — POTASSIUM CHLORIDE 20 MEQ
10 PACKET (EA) ORAL ONCE
Refills: 0 | Status: COMPLETED | OUTPATIENT
Start: 2020-09-27 | End: 2020-09-27

## 2020-09-27 RX ADMIN — SCOPALAMINE 1 PATCH: 1 PATCH, EXTENDED RELEASE TRANSDERMAL at 15:26

## 2020-09-27 RX ADMIN — Medication 100 MILLIGRAM(S): at 13:22

## 2020-09-27 RX ADMIN — Medication 100 MILLIGRAM(S): at 05:42

## 2020-09-27 RX ADMIN — SCOPALAMINE 1 PATCH: 1 PATCH, EXTENDED RELEASE TRANSDERMAL at 19:12

## 2020-09-27 RX ADMIN — AMLODIPINE BESYLATE 5 MILLIGRAM(S): 2.5 TABLET ORAL at 05:44

## 2020-09-27 RX ADMIN — OXYCODONE HYDROCHLORIDE 5 MILLIGRAM(S): 5 TABLET ORAL at 13:40

## 2020-09-27 RX ADMIN — Medication 60 MILLIGRAM(S): at 14:53

## 2020-09-27 RX ADMIN — Medication 650 MILLIGRAM(S): at 11:39

## 2020-09-27 RX ADMIN — OXYCODONE HYDROCHLORIDE 5 MILLIGRAM(S): 5 TABLET ORAL at 13:36

## 2020-09-27 RX ADMIN — Medication 100 MILLIEQUIVALENT(S): at 19:29

## 2020-09-27 RX ADMIN — CEFTRIAXONE 100 MILLIGRAM(S): 500 INJECTION, POWDER, FOR SOLUTION INTRAMUSCULAR; INTRAVENOUS at 15:26

## 2020-09-27 RX ADMIN — ZOLPIDEM TARTRATE 10 MILLIGRAM(S): 10 TABLET ORAL at 22:16

## 2020-09-27 RX ADMIN — ONDANSETRON 4 MILLIGRAM(S): 8 TABLET, FILM COATED ORAL at 15:26

## 2020-09-27 RX ADMIN — Medication 25 MILLIGRAM(S): at 05:44

## 2020-09-27 RX ADMIN — PANTOPRAZOLE SODIUM 40 MILLIGRAM(S): 20 TABLET, DELAYED RELEASE ORAL at 05:46

## 2020-09-27 RX ADMIN — SENNA PLUS 2 TABLET(S): 8.6 TABLET ORAL at 13:22

## 2020-09-27 RX ADMIN — Medication 650 MILLIGRAM(S): at 11:44

## 2020-09-27 RX ADMIN — HEPARIN SODIUM 5000 UNIT(S): 5000 INJECTION INTRAVENOUS; SUBCUTANEOUS at 22:16

## 2020-09-27 RX ADMIN — ONDANSETRON 4 MILLIGRAM(S): 8 TABLET, FILM COATED ORAL at 09:10

## 2020-09-27 NOTE — PROGRESS NOTE ADULT - SUBJECTIVE AND OBJECTIVE BOX
SUBJECTIVE: Patient seen and examined bedside by chief resident. Patient reports feeling much better and feels less distended. Specifically denies nausea, vomit, chest pain, SOB, calves tenderness, fever, dizziness, disuria. Good acceptance of diet without nausea, vomit or postpandrial discomfort, out of bed and ambulating. Flatus presents.     amLODIPine   Tablet 5 milliGRAM(s) Oral daily  cefTRIAXone   IVPB 1000 milliGRAM(s) IV Intermittent every 24 hours  heparin   Injectable 5000 Unit(s) SubCutaneous every 8 hours  hydrochlorothiazide 25 milliGRAM(s) Oral daily  metroNIDAZOLE  IVPB      metroNIDAZOLE  IVPB 500 milliGRAM(s) IV Intermittent every 8 hours      Vital Signs Last 24 Hrs  T(C): 37.1 (27 Sep 2020 05:33), Max: 37.2 (26 Sep 2020 16:40)  T(F): 98.8 (27 Sep 2020 05:33), Max: 98.9 (26 Sep 2020 16:40)  HR: 64 (27 Sep 2020 05:33) (62 - 82)  BP: 128/75 (27 Sep 2020 05:33) (125/80 - 146/94)  BP(mean): 96 (26 Sep 2020 16:40) (96 - 96)  RR: 17 (27 Sep 2020 05:33) (17 - 19)  SpO2: 98% (27 Sep 2020 05:33) (96% - 98%)  I&O's Detail    26 Sep 2020 07:01  -  27 Sep 2020 07:00  --------------------------------------------------------  IN:    IV PiggyBack: 50 mL    IV PiggyBack: 100 mL    Oral Fluid: 470 mL  Total IN: 620 mL    OUT:    Voided (mL): 900 mL  Total OUT: 900 mL    Total NET: -280 mL          PHYSICAL EXAMINATION   General: NAD, resting comfortably in bed.   NEURO: AAOx3, follows commands.   HEENT: MMM, non icteric,   CV: RRR, no MRG  PULM: nonlabored breathing, no respiratory distress  ABD: soft, nondistended, appropriately tender, no rebound, no guarding, no tympany. Incisions CDI and without hematoma or erythema    EXTREM: WWP, no edema, no calf tenderness  VASC: No cyanosis,   no pallor, palpable radial and pulses pedis 2+ bilaterally  SKIN: No rashes noted  PSYCH: Appropriate affect, answers questions appropriately      LABS:                        12.0   9.44  )-----------( 218      ( 27 Sep 2020 07:11 )             36.6     09-27    138  |  97  |  7   ----------------------------<  112<H>  3.7   |  26  |  1.09    Ca    9.4      27 Sep 2020 07:11  Phos  3.5     09-27  Mg     1.9     09-27

## 2020-09-27 NOTE — PROGRESS NOTE ADULT - SUBJECTIVE AND OBJECTIVE BOX
INTERNAL MEDICINE PROGRESS NOTE  Patient seen and examined at bedside. Patient reports improvement in abdominal pain, passing flatus and stool. No fevers, chills, nausea or vomiting. Tolerating clears, plans on attempting more full liquids and soft food today.     PERTINENT REVIEW OF SYSTEMS:  CONSTITUTIONAL: No weakness, fevers or chills  HEENT: No visual changes; No vertigo or throat pain   GASTROINTESTINAL: No abdominal or epigastric pain. No nausea, vomiting, or hematemesis; No diarrhea or constipation. No melena or hematochezia.  NEUROLOGICAL: No numbness or weakness  SKIN: No itching, burning, rashes, or lesions     Allergies    ACE inhibitors (Unknown)  Compazine (Unknown)    Intolerances      MEDICATIONS:  MEDICATIONS  (STANDING):  acetaminophen  IVPB .. 1000 milliGRAM(s) IV Intermittent once  amLODIPine   Tablet 5 milliGRAM(s) Oral daily  cefTRIAXone   IVPB 1000 milliGRAM(s) IV Intermittent every 24 hours  FLUoxetine 60 milliGRAM(s) Oral daily  heparin   Injectable 5000 Unit(s) SubCutaneous every 8 hours  hydrochlorothiazide 25 milliGRAM(s) Oral daily  melatonin 3 milliGRAM(s) Oral at bedtime  metroNIDAZOLE  IVPB      metroNIDAZOLE  IVPB 500 milliGRAM(s) IV Intermittent every 8 hours  pantoprazole    Tablet 40 milliGRAM(s) Oral before breakfast  potassium chloride    Tablet ER 40 milliEquivalent(s) Oral once  senna 2 Tablet(s) Oral daily    MEDICATIONS  (PRN):  acetaminophen   Tablet .. 650 milliGRAM(s) Oral every 6 hours PRN Mild Pain (1 - 3)  ondansetron Injectable 4 milliGRAM(s) IV Push every 6 hours PRN Nausea  oxyCODONE    IR 5 milliGRAM(s) Oral every 4 hours PRN Moderate Pain (4 - 6)  oxyCODONE    IR 10 milliGRAM(s) Oral every 6 hours PRN Severe Pain (7 - 10)  zolpidem 10 milliGRAM(s) Oral at bedtime PRN Insomnia    Vital Signs Last 24 Hrs  T(C): 37.1 (27 Sep 2020 09:22), Max: 37.2 (26 Sep 2020 16:40)  T(F): 98.7 (27 Sep 2020 09:22), Max: 98.9 (26 Sep 2020 16:40)  HR: 72 (27 Sep 2020 09:22) (62 - 82)  BP: 126/80 (27 Sep 2020 09:22) (125/80 - 146/94)  BP(mean): 96 (26 Sep 2020 16:40) (96 - 96)  RR: 18 (27 Sep 2020 09:22) (17 - 18)  SpO2: 95% (27 Sep 2020 09:22) (95% - 98%)    09-26 @ 07:01  -  09-27 @ 07:00  --------------------------------------------------------  IN: 620 mL / OUT: 900 mL / NET: -280 mL    09-27 @ 07:01 - 09-27 @ 12:57  --------------------------------------------------------  IN: 1000 mL / OUT: 0 mL / NET: 1000 mL      PHYSICAL EXAM:    General: Well developed; well nourished; in no acute distress  HEENT: MMM, conjunctiva and sclera clear  Gastrointestinal: Soft non-tender non-distended; Normal bowel sounds; No hepatosplenomegaly. No rebound or guarding  Skin: Warm and dry. No obvious rash    LABS:                        12.0   9.44  )-----------( 218      ( 27 Sep 2020 07:11 )             36.6     09-27    138  |  97  |  7   ----------------------------<  112<H>  3.7   |  26  |  1.09    Ca    9.4      27 Sep 2020 07:11  Phos  3.5     09-27  Mg     1.9     09-27                        RADIOLOGY & ADDITIONAL STUDIES:  Reviewed

## 2020-09-28 ENCOUNTER — TRANSCRIPTION ENCOUNTER (OUTPATIENT)
Age: 70
End: 2020-09-28

## 2020-09-28 LAB
ANION GAP SERPL CALC-SCNC: 12 MMOL/L — SIGNIFICANT CHANGE UP (ref 5–17)
BUN SERPL-MCNC: 8 MG/DL — SIGNIFICANT CHANGE UP (ref 7–23)
CALCIUM SERPL-MCNC: 9.6 MG/DL — SIGNIFICANT CHANGE UP (ref 8.4–10.5)
CHLORIDE SERPL-SCNC: 97 MMOL/L — SIGNIFICANT CHANGE UP (ref 96–108)
CO2 SERPL-SCNC: 27 MMOL/L — SIGNIFICANT CHANGE UP (ref 22–31)
CREAT SERPL-MCNC: 1.09 MG/DL — SIGNIFICANT CHANGE UP (ref 0.5–1.3)
GLUCOSE SERPL-MCNC: 140 MG/DL — HIGH (ref 70–99)
HCT VFR BLD CALC: 37.4 % — SIGNIFICANT CHANGE UP (ref 34.5–45)
HGB BLD-MCNC: 12.3 G/DL — SIGNIFICANT CHANGE UP (ref 11.5–15.5)
MAGNESIUM SERPL-MCNC: 2 MG/DL — SIGNIFICANT CHANGE UP (ref 1.6–2.6)
MCHC RBC-ENTMCNC: 27.6 PG — SIGNIFICANT CHANGE UP (ref 27–34)
MCHC RBC-ENTMCNC: 32.9 GM/DL — SIGNIFICANT CHANGE UP (ref 32–36)
MCV RBC AUTO: 84 FL — SIGNIFICANT CHANGE UP (ref 80–100)
NRBC # BLD: 0 /100 WBCS — SIGNIFICANT CHANGE UP (ref 0–0)
PHOSPHATE SERPL-MCNC: 3.1 MG/DL — SIGNIFICANT CHANGE UP (ref 2.5–4.5)
PLATELET # BLD AUTO: 246 K/UL — SIGNIFICANT CHANGE UP (ref 150–400)
POTASSIUM SERPL-MCNC: 3.6 MMOL/L — SIGNIFICANT CHANGE UP (ref 3.5–5.3)
POTASSIUM SERPL-SCNC: 3.6 MMOL/L — SIGNIFICANT CHANGE UP (ref 3.5–5.3)
RBC # BLD: 4.45 M/UL — SIGNIFICANT CHANGE UP (ref 3.8–5.2)
RBC # FLD: 16.2 % — HIGH (ref 10.3–14.5)
SODIUM SERPL-SCNC: 136 MMOL/L — SIGNIFICANT CHANGE UP (ref 135–145)
WBC # BLD: 12.38 K/UL — HIGH (ref 3.8–10.5)
WBC # FLD AUTO: 12.38 K/UL — HIGH (ref 3.8–10.5)

## 2020-09-28 PROCEDURE — 99233 SBSQ HOSP IP/OBS HIGH 50: CPT | Mod: GC

## 2020-09-28 RX ORDER — POTASSIUM CHLORIDE 20 MEQ
40 PACKET (EA) ORAL ONCE
Refills: 0 | Status: COMPLETED | OUTPATIENT
Start: 2020-09-28 | End: 2020-09-28

## 2020-09-28 RX ADMIN — Medication 40 MILLIEQUIVALENT(S): at 11:17

## 2020-09-28 RX ADMIN — SENNA PLUS 2 TABLET(S): 8.6 TABLET ORAL at 11:16

## 2020-09-28 RX ADMIN — Medication 60 MILLIGRAM(S): at 11:16

## 2020-09-28 RX ADMIN — SCOPALAMINE 1 PATCH: 1 PATCH, EXTENDED RELEASE TRANSDERMAL at 19:14

## 2020-09-28 RX ADMIN — Medication 650 MILLIGRAM(S): at 18:12

## 2020-09-28 RX ADMIN — Medication 650 MILLIGRAM(S): at 05:27

## 2020-09-28 RX ADMIN — PANTOPRAZOLE SODIUM 40 MILLIGRAM(S): 20 TABLET, DELAYED RELEASE ORAL at 07:09

## 2020-09-28 RX ADMIN — Medication 100 MILLIGRAM(S): at 07:09

## 2020-09-28 RX ADMIN — Medication 650 MILLIGRAM(S): at 19:14

## 2020-09-28 RX ADMIN — SCOPALAMINE 1 PATCH: 1 PATCH, EXTENDED RELEASE TRANSDERMAL at 05:27

## 2020-09-28 RX ADMIN — Medication 100 MILLIGRAM(S): at 00:25

## 2020-09-28 RX ADMIN — ZOLPIDEM TARTRATE 10 MILLIGRAM(S): 10 TABLET ORAL at 22:22

## 2020-09-28 NOTE — DISCHARGE NOTE PROVIDER - CARE PROVIDER_API CALL
Chino Mccartney)  Surgery  [address] [address]  Phone: [phone]  Fax: [phone]  Follow Up Time:

## 2020-09-28 NOTE — DISCHARGE NOTE PROVIDER - NSDCCPCAREPLAN_GEN_ALL_CORE_FT
PRINCIPAL DISCHARGE DIAGNOSIS  Diagnosis: Abdominal pain  Assessment and Plan of Treatment:        PRINCIPAL DISCHARGE DIAGNOSIS  Diagnosis: Perforated sigmoid colon  Assessment and Plan of Treatment: You came in from your outpatient provider's office after a screening colonoscopy. There was concern at the office that your colon had been perforated during the procedure. We evaluated you with labs, physical exam, and imaging. You went to the operating room for a diagnositic laparoscopy and colonoscopy. A perforation was found and repaired in your sigmoid colon at the time. After the procedure you healed well with good toleartion of a diet and return of bowel function.

## 2020-09-28 NOTE — DISCHARGE NOTE PROVIDER - NSDCFUADDAPPT_GEN_ALL_CORE_FT
Please follow-up with Dr. Mccartney in about 1 week at his office. You can call his office ot make an appointment at [phone].

## 2020-09-28 NOTE — PROGRESS NOTE ADULT - SUBJECTIVE AND OBJECTIVE BOX
SUBJECTIVE: Patient seen and examined bedside by chief resident. Patient reports feeling better, does report some ocassional nausea specially after eating.  Specifically denies vomit, chest pain, SOB, calves tenderness, fever, dizziness, disuria.  out of bed and ambulating. Flatus and bowel movement presents.     amLODIPine   Tablet 5 milliGRAM(s) Oral daily  cefTRIAXone   IVPB 1000 milliGRAM(s) IV Intermittent every 24 hours  heparin   Injectable 5000 Unit(s) SubCutaneous every 8 hours  hydrochlorothiazide 25 milliGRAM(s) Oral daily  metroNIDAZOLE  IVPB      metroNIDAZOLE  IVPB 500 milliGRAM(s) IV Intermittent every 8 hours      Vital Signs Last 24 Hrs  T(C): 36.9 (28 Sep 2020 05:25), Max: 37.3 (27 Sep 2020 13:25)  T(F): 98.5 (28 Sep 2020 05:25), Max: 99.2 (27 Sep 2020 13:25)  HR: 65 (28 Sep 2020 05:25) (65 - 73)  BP: 104/72 (28 Sep 2020 05:25) (104/72 - 126/80)  BP(mean): 95 (27 Sep 2020 16:21) (95 - 95)  RR: 18 (28 Sep 2020 05:25) (17 - 18)  SpO2: 95% (28 Sep 2020 05:25) (95% - 99%)  I&O's Detail    27 Sep 2020 07:01  -  28 Sep 2020 07:00  --------------------------------------------------------  IN:    Oral Fluid: 1000 mL  Total IN: 1000 mL    OUT:    Voided (mL): 1400 mL  Total OUT: 1400 mL    Total NET: -400 mL          PHYSICAL EXAMINATION   General: NAD, resting comfortably in bed.   NEURO:  follows commands.   HEENT: MMM, non icteric,   PULM: nonlabored breathing, no respiratory distress  ABD: soft, nondistended, appropriately tender, no rebound, no guarding, no tympany. Incisions CDI and without hematoma or erythema    EXTREM: WWP, no edema, no calf tenderness  VASC: No cyanosis,  no pallor, palpable radial and pulses pedis 2+ bilaterally  SKIN: No rashes noted  PSYCH: Appropriate affect, answers questions appropriately      LABS:                        12.3   12.38 )-----------( 246      ( 28 Sep 2020 06:44 )             37.4     09-28    136  |  97  |  8   ----------------------------<  140<H>  3.6   |  27  |  1.09    Ca    9.6      28 Sep 2020 06:44  Phos  3.1     09-28  Mg     2.0     09-28

## 2020-09-28 NOTE — DISCHARGE NOTE PROVIDER - HOSPITAL COURSE
Discharge Summary  ED course: The patient is a 70 year old female with PMH of HTN, depression, GERD (s/p Nissen fundoplication in 2012 at [place]) who presented to the [facility] ED on 9/21/20 for sharp, diffuse, and constant abdominal pain following outpatient colonoscopy in the morning suspicious for perforated bowel during the procedure. She admitted to having LLQ abdominal pain with associated blood in the stool for a few weeks prior to the procedure. Denied having fever, chills, constipation, or diarrhea at the time.   OR course: The patient had a diagnostic laparoscopy, YONATAN, repair of sigmoid colon, colonoscopy, and methylene blue leak test in the OR by Dr Mccartney on 9/21/20 without complications. A RAN drain was placed at the time.   Floor course: The patient had a post-operative course of abdominal distension and delay in return of bowel function.  Patient had a course of antibiotic for 6 days with Ceftriaxone and flagyl, was restarted on her home medications, diet was advanced according to patient's clinical status and return of bowel function. RAN was pulled in POD4.   Now the patient is feeling well, HDS, stable VS, tolerating well a LRD , denies having nausea or vomiting, ambulating well and normal bowel function. The patient will be staying at her sister’s home as her house is currently under renovation. We will discharge the patient home with follow up appointment with PCP and Dr. Mccartney in a week.    The patient is a 70 year old female with PMH of HTN, depression, GERD (s/p Nissen fundoplication in 2012 at [place]) who presented to the [facility] ED on 9/21/20 for sharp, diffuse, and constant abdominal pain following outpatient colonoscopy in the morning suspicious for perforated bowel during the procedure. She admitted to having LLQ abdominal pain with associated blood in the stool for a few weeks prior to the procedure. Denied having fever, chills, constipation, or diarrhea at the time.   She underwent diagnostic laparoscopy, YONATAN, repair of sigmoid colon, colonoscopy, and methylene blue leak test in the OR by Dr Mccartney on 9/21/20 without complications. A RAN drain was placed at the time. The patient had a post-operative course of abdominal distension and delay in return of bowel function.  Patient had a course of antibiotic for 6 days with Ceftriaxone and flagyl, was restarted on her home medications, diet was advanced according to patient's clinical status and return of bowel function. RAN was pulled in POD4.   On the  they day of discharge the patient is feeling well, HDS, stable VS, tolerating a LRD , denies having nausea or vomiting, ambulating well and normal bowel function. The patient will be staying at her sister’s home as her house is currently under renovation. The patient is aware to follow up with her PCP and Dr. Mccartney in a week. She has been counseled on warning signs.

## 2020-09-28 NOTE — DISCHARGE NOTE PROVIDER - NSDCCPTREATMENT_GEN_ALL_CORE_FT
PRINCIPAL PROCEDURE  Procedure: Repair of sigmoid colon  Findings and Treatment: We repaired your sigmoid colon primarily in the operating room after identifying the perforation using colonoscopy and diagnositic laparoscopy.      SECONDARY PROCEDURE  Procedure: Diagnostic laparoscopy  Findings and Treatment: We used diagnositic laparoscopy to identify your colonic perofration in the operating room and we were able to visualize the perforation using the laparoscope.

## 2020-09-28 NOTE — DISCHARGE NOTE PROVIDER - NSDCMRMEDTOKEN_GEN_ALL_CORE_FT
hydroCHLOROthiazide 25 mg oral tablet: 1 tab(s) orally once a day  Imitrex 50 mg oral tablet: 1 tab(s) orally once, As Needed  Protonix 40 mg oral delayed release tablet: 1 tab(s) orally once a day  PROzac: 60 milligram(s) orally once a day  zolpidem 12.5 mg oral tablet, extended release: 1 tab(s) orally once a day (at bedtime)

## 2020-09-28 NOTE — DISCHARGE NOTE PROVIDER - NSDCFUADDINST_GEN_ALL_CORE_FT
General Discharge Instructions:  Please resume all regular home medications unless specifically advised not to take a particular medication. Also, please take any new medications as prescribed.  Please get plenty of rest, continue to ambulate several times per day, and drink adequate amounts of fluids. Avoid lifting weights greater than 5-10 lbs until you follow-up with your surgeon, who will instruct you further regarding activity restrictions.  Avoid driving or operating heavy machinery while taking pain medications.  Please follow-up with your surgeon and Primary Care Provider (PCP) as advised.  Incision Care:  *Please call your doctor or nurse practitioner if you have increased pain, swelling, redness, or drainage from the incision site.  *Avoid swimming and baths until your follow-up appointment.  *You may shower, and wash surgical incisions with a mild soap and warm water. Gently pat the area dry.  *If you have staples, they will be removed at your follow-up appointment.  *If you have steri-strips, they will fall off on their own. Please remove any remaining strips 7-10 days after surgery.    Warning Signs:  Please call your doctor or nurse practitioner if you experience the following:  *You experience new chest pain, pressure, squeezing or tightness.  *New or worsening cough, shortness of breath, or wheeze.  *If you are vomiting and cannot keep down fluids or your medications.  *You are getting dehydrated due to continued vomiting, diarrhea, or other reasons. Signs of dehydration include dry mouth, rapid heartbeat, or feeling dizzy or faint when standing.  *You see blood or dark/black material when you vomit or have a bowel movement.  *You experience burning when you urinate, have blood in your urine, or experience a discharge.  *Your pain is not improving within 8-12 hours or is not gone within 24 hours. Call or return immediately if your pain is getting worse, changes location, or moves to your chest or back.  *You have shaking chills, or fever greater than 101.5 degrees Fahrenheit or 38 degrees Celsius.  *Any change in your symptoms, or any new symptoms that concern you.     Please alternate Tyelnol and Advil to manage your pain.     General Discharge Instructions:  Please resume all regular home medications unless specifically advised not to take a particular medication. Also, please take any new medications as prescribed.  Please get plenty of rest, continue to ambulate several times per day, and drink adequate amounts of fluids. Avoid lifting weights greater than 5-10 lbs until you follow-up with your surgeon, who will instruct you further regarding activity restrictions.  Avoid driving or operating heavy machinery while taking pain medications.  Please follow-up with your surgeon and Primary Care Provider (PCP) as advised.  Incision Care:  *Please call your doctor or nurse practitioner if you have increased pain, swelling, redness, or drainage from the incision site.  *Avoid swimming and baths until your follow-up appointment.  *You may shower, and wash surgical incisions with a mild soap and warm water. Gently pat the area dry.  *If you have staples, they will be removed at your follow-up appointment.  *If you have steri-strips, they will fall off on their own. Please remove any remaining strips 7-10 days after surgery.    Warning Signs:  Please call your doctor or nurse practitioner if you experience the following:  *You experience new chest pain, pressure, squeezing or tightness.  *New or worsening cough, shortness of breath, or wheeze.  *If you are vomiting and cannot keep down fluids or your medications.  *You are getting dehydrated due to continued vomiting, diarrhea, or other reasons. Signs of dehydration include dry mouth, rapid heartbeat, or feeling dizzy or faint when standing.  *You see blood or dark/black material when you vomit or have a bowel movement.  *You experience burning when you urinate, have blood in your urine, or experience a discharge.  *Your pain is not improving within 8-12 hours or is not gone within 24 hours. Call or return immediately if your pain is getting worse, changes location, or moves to your chest or back.  *You have shaking chills, or fever greater than 101.5 degrees Fahrenheit or 38 degrees Celsius.  *Any change in your symptoms, or any new symptoms that concern you.

## 2020-09-28 NOTE — PROGRESS NOTE ADULT - SUBJECTIVE AND OBJECTIVE BOX
O/N and interval events: None    Subjective:  Pt has no acute complaints. Denies skyler pain. Denies SOB/CP. ROS is otherwise negative.     Allergies    ACE inhibitors (Unknown)  Compazine (Unknown)    Intolerances    Home meds: Prozac, HCTZ 25 mg PO qd        MEDICATIONS  (STANDING):  acetaminophen  IVPB .. 1000 milliGRAM(s) IV Intermittent once  amLODIPine   Tablet 5 milliGRAM(s) Oral daily  FLUoxetine 60 milliGRAM(s) Oral daily  heparin   Injectable 5000 Unit(s) SubCutaneous every 8 hours  hydrochlorothiazide 25 milliGRAM(s) Oral daily  melatonin 3 milliGRAM(s) Oral at bedtime  pantoprazole    Tablet 40 milliGRAM(s) Oral before breakfast  senna 2 Tablet(s) Oral daily    MEDICATIONS  (PRN):  acetaminophen   Tablet .. 650 milliGRAM(s) Oral every 6 hours PRN Mild Pain (1 - 3)  ondansetron Injectable 4 milliGRAM(s) IV Push every 6 hours PRN Nausea  oxyCODONE    IR 5 milliGRAM(s) Oral every 4 hours PRN Moderate Pain (4 - 6)  oxyCODONE    IR 10 milliGRAM(s) Oral every 6 hours PRN Severe Pain (7 - 10)  zolpidem 10 milliGRAM(s) Oral at bedtime PRN Insomnia          Drug Dosing Weight  Height (cm): 172.7 (21 Sep 2020 10:30)  Weight (kg): 91.6 (21 Sep 2020 10:30)  BMI (kg/m2): 30.7 (21 Sep 2020 10:30)  BSA (m2): 2.05 (21 Sep 2020 10:30)    PAST MEDICAL & SURGICAL HISTORY:  GERD (gastroesophageal reflux disease)    Depression    HTN (hypertension)    History of Nissen fundoplication        FAMILY HISTORY:  no reported cardiac history reported    SOCIAL HISTORY:  social EtOH use, no smoking, no drug use    Vital Signs Last 24 Hrs  T(C): 36.7 (28 Sep 2020 20:20), Max: 36.9 (28 Sep 2020 05:25)  T(F): 98.1 (28 Sep 2020 20:20), Max: 98.5 (28 Sep 2020 05:25)  HR: 70 (28 Sep 2020 20:20) (58 - 70)  BP: 118/89 (28 Sep 2020 20:20) (104/72 - 133/87)  BP(mean): --  RR: 18 (28 Sep 2020 20:20) (17 - 18)  SpO2: 97% (28 Sep 2020 20:20) (95% - 98%)      PHYSICAL EXAM:      Constitutional: NAD  Eyes: PERRLA  ENMT: MMM  Neck: supple  Back: midline  Respiratory: CTA b/l  Cardiovascular: rrr, s1s2, no m/r/g  Gastrointestinal: soft, distended, mild TTP, RAN drain +  Extremities: wwp  Vascular: + 2 pulses radial  Neurological: AAO x 4  Skin: no rash  Lymph Nodes: no LAD  Musculoskeletal: no joint swelling  Psychiatric: normal affect    LABS:                                     12.3   12.38 )-----------( 246      ( 28 Sep 2020 06:44 )             37.4   09-28    136  |  97  |  8   ----------------------------<  140<H>  3.6   |  27  |  1.09    Ca    9.6      28 Sep 2020 06:44  Phos  3.1     09-28  Mg     2.0     09-28                  EKG:    ECHO, US:    RADIOLOGY:

## 2020-09-28 NOTE — PROCEDURE NOTE - NSPROCDETAILS_GEN_ALL_CORE
location identified, draped/prepped, sterile technique used/flushes easily/blood seen on insertion/sterile technique, catheter placed/dressing applied/secured in place

## 2020-09-29 ENCOUNTER — TRANSCRIPTION ENCOUNTER (OUTPATIENT)
Age: 70
End: 2020-09-29

## 2020-09-29 VITALS
OXYGEN SATURATION: 99 % | TEMPERATURE: 98 F | HEART RATE: 68 BPM | RESPIRATION RATE: 18 BRPM | DIASTOLIC BLOOD PRESSURE: 74 MMHG | SYSTOLIC BLOOD PRESSURE: 127 MMHG

## 2020-09-29 LAB
ANION GAP SERPL CALC-SCNC: 11 MMOL/L — SIGNIFICANT CHANGE UP (ref 5–17)
BUN SERPL-MCNC: 8 MG/DL — SIGNIFICANT CHANGE UP (ref 7–23)
CALCIUM SERPL-MCNC: 9.1 MG/DL — SIGNIFICANT CHANGE UP (ref 8.4–10.5)
CHLORIDE SERPL-SCNC: 100 MMOL/L — SIGNIFICANT CHANGE UP (ref 96–108)
CO2 SERPL-SCNC: 26 MMOL/L — SIGNIFICANT CHANGE UP (ref 22–31)
CREAT SERPL-MCNC: 1.1 MG/DL — SIGNIFICANT CHANGE UP (ref 0.5–1.3)
GLUCOSE SERPL-MCNC: 122 MG/DL — HIGH (ref 70–99)
HCT VFR BLD CALC: 36.7 % — SIGNIFICANT CHANGE UP (ref 34.5–45)
HGB BLD-MCNC: 11.9 G/DL — SIGNIFICANT CHANGE UP (ref 11.5–15.5)
MAGNESIUM SERPL-MCNC: 2.2 MG/DL — SIGNIFICANT CHANGE UP (ref 1.6–2.6)
MCHC RBC-ENTMCNC: 27.5 PG — SIGNIFICANT CHANGE UP (ref 27–34)
MCHC RBC-ENTMCNC: 32.4 GM/DL — SIGNIFICANT CHANGE UP (ref 32–36)
MCV RBC AUTO: 84.8 FL — SIGNIFICANT CHANGE UP (ref 80–100)
NRBC # BLD: 0 /100 WBCS — SIGNIFICANT CHANGE UP (ref 0–0)
PHOSPHATE SERPL-MCNC: 3.1 MG/DL — SIGNIFICANT CHANGE UP (ref 2.5–4.5)
PLATELET # BLD AUTO: 257 K/UL — SIGNIFICANT CHANGE UP (ref 150–400)
POTASSIUM SERPL-MCNC: 4.2 MMOL/L — SIGNIFICANT CHANGE UP (ref 3.5–5.3)
POTASSIUM SERPL-SCNC: 4.2 MMOL/L — SIGNIFICANT CHANGE UP (ref 3.5–5.3)
RBC # BLD: 4.33 M/UL — SIGNIFICANT CHANGE UP (ref 3.8–5.2)
RBC # FLD: 16.6 % — HIGH (ref 10.3–14.5)
SODIUM SERPL-SCNC: 137 MMOL/L — SIGNIFICANT CHANGE UP (ref 135–145)
WBC # BLD: 11.27 K/UL — HIGH (ref 3.8–10.5)
WBC # FLD AUTO: 11.27 K/UL — HIGH (ref 3.8–10.5)

## 2020-09-29 PROCEDURE — 85730 THROMBOPLASTIN TIME PARTIAL: CPT

## 2020-09-29 PROCEDURE — 86850 RBC ANTIBODY SCREEN: CPT

## 2020-09-29 PROCEDURE — 99233 SBSQ HOSP IP/OBS HIGH 50: CPT | Mod: GC

## 2020-09-29 PROCEDURE — 85610 PROTHROMBIN TIME: CPT

## 2020-09-29 PROCEDURE — 85027 COMPLETE CBC AUTOMATED: CPT

## 2020-09-29 PROCEDURE — 87635 SARS-COV-2 COVID-19 AMP PRB: CPT

## 2020-09-29 PROCEDURE — 36415 COLL VENOUS BLD VENIPUNCTURE: CPT

## 2020-09-29 PROCEDURE — 80053 COMPREHEN METABOLIC PANEL: CPT

## 2020-09-29 PROCEDURE — 71045 X-RAY EXAM CHEST 1 VIEW: CPT

## 2020-09-29 PROCEDURE — 96374 THER/PROPH/DIAG INJ IV PUSH: CPT

## 2020-09-29 PROCEDURE — 83735 ASSAY OF MAGNESIUM: CPT

## 2020-09-29 PROCEDURE — 90662 IIV NO PRSV INCREASED AG IM: CPT

## 2020-09-29 PROCEDURE — 84100 ASSAY OF PHOSPHORUS: CPT

## 2020-09-29 PROCEDURE — 86900 BLOOD TYPING SEROLOGIC ABO: CPT

## 2020-09-29 PROCEDURE — 74019 RADEX ABDOMEN 2 VIEWS: CPT

## 2020-09-29 PROCEDURE — 80048 BASIC METABOLIC PNL TOTAL CA: CPT

## 2020-09-29 PROCEDURE — 99285 EMERGENCY DEPT VISIT HI MDM: CPT | Mod: 25

## 2020-09-29 PROCEDURE — 85025 COMPLETE CBC W/AUTO DIFF WBC: CPT

## 2020-09-29 PROCEDURE — 93005 ELECTROCARDIOGRAM TRACING: CPT

## 2020-09-29 PROCEDURE — 86901 BLOOD TYPING SEROLOGIC RH(D): CPT

## 2020-09-29 RX ORDER — INFLUENZA VIRUS VACCINE 15; 15; 15; 15 UG/.5ML; UG/.5ML; UG/.5ML; UG/.5ML
0.7 SUSPENSION INTRAMUSCULAR ONCE
Refills: 0 | Status: COMPLETED | OUTPATIENT
Start: 2020-09-29 | End: 2020-09-29

## 2020-09-29 RX ORDER — INFLUENZA VIRUS VACCINE 15; 15; 15; 15 UG/.5ML; UG/.5ML; UG/.5ML; UG/.5ML
0.5 SUSPENSION INTRAMUSCULAR ONCE
Refills: 0 | Status: DISCONTINUED | OUTPATIENT
Start: 2020-09-29 | End: 2020-09-29

## 2020-09-29 RX ADMIN — PANTOPRAZOLE SODIUM 40 MILLIGRAM(S): 20 TABLET, DELAYED RELEASE ORAL at 05:50

## 2020-09-29 RX ADMIN — Medication 25 MILLIGRAM(S): at 05:50

## 2020-09-29 RX ADMIN — INFLUENZA VIRUS VACCINE 0.7 MILLILITER(S): 15; 15; 15; 15 SUSPENSION INTRAMUSCULAR at 10:01

## 2020-09-29 RX ADMIN — SCOPALAMINE 1 PATCH: 1 PATCH, EXTENDED RELEASE TRANSDERMAL at 07:28

## 2020-09-29 RX ADMIN — AMLODIPINE BESYLATE 5 MILLIGRAM(S): 2.5 TABLET ORAL at 05:50

## 2020-09-29 RX ADMIN — Medication 60 MILLIGRAM(S): at 10:15

## 2020-09-29 NOTE — PROGRESS NOTE ADULT - PROBLEM SELECTOR PLAN 5
Downtrending, in setting of recent procedure

## 2020-09-29 NOTE — PROGRESS NOTE ADULT - PROBLEM SELECTOR PROBLEM 1
Perforation of colon

## 2020-09-29 NOTE — PROGRESS NOTE ADULT - PROBLEM SELECTOR PLAN 1
Management as per primary team
Management as per primary team
Management as per primary team  -CTX/Flagyl

## 2020-09-29 NOTE — PROGRESS NOTE ADULT - PROVIDER SPECIALTY LIST ADULT
Hospitalist
Internal Medicine
Internal Medicine
Surgery
Hospitalist

## 2020-09-29 NOTE — PROGRESS NOTE ADULT - SUBJECTIVE AND OBJECTIVE BOX
SUBJECTIVE: luz diet, no N/V, +BF.  Patient seen and examined bedside by chief resident.    amLODIPine   Tablet 5 milliGRAM(s) Oral daily  heparin   Injectable 5000 Unit(s) SubCutaneous every 8 hours  hydrochlorothiazide 25 milliGRAM(s) Oral daily    MEDICATIONS  (PRN):  acetaminophen   Tablet .. 650 milliGRAM(s) Oral every 6 hours PRN Mild Pain (1 - 3)  ondansetron Injectable 4 milliGRAM(s) IV Push every 6 hours PRN Nausea  oxyCODONE    IR 5 milliGRAM(s) Oral every 4 hours PRN Moderate Pain (4 - 6)  oxyCODONE    IR 10 milliGRAM(s) Oral every 6 hours PRN Severe Pain (7 - 10)  zolpidem 10 milliGRAM(s) Oral at bedtime PRN Insomnia      I&O's Detail    28 Sep 2020 07:01  -  29 Sep 2020 07:00  --------------------------------------------------------  IN:    Oral Fluid: 730 mL  Total IN: 730 mL    OUT:    Voided (mL): 800 mL  Total OUT: 800 mL    Total NET: -70 mL          Vital Signs Last 24 Hrs  T(C): 37 (29 Sep 2020 05:40), Max: 37 (29 Sep 2020 05:40)  T(F): 98.6 (29 Sep 2020 05:40), Max: 98.6 (29 Sep 2020 05:40)  HR: 67 (29 Sep 2020 05:40) (58 - 70)  BP: 128/87 (29 Sep 2020 05:40) (118/89 - 133/87)  BP(mean): 100 (29 Sep 2020 05:40) (100 - 100)  RR: 18 (29 Sep 2020 05:40) (17 - 18)  SpO2: 98% (29 Sep 2020 05:40) (97% - 98%)    General: NAD, resting comfortably in bed  C/V: NSR  Pulm: Nonlabored breathing, no respiratory distress  Abd: soft, NT/ND, incisions CDI  Extrem: SCDs in place    LABS:                        11.9   11.27 )-----------( 257      ( 29 Sep 2020 06:58 )             36.7     09-28    136  |  97  |  8   ----------------------------<  140<H>  3.6   |  27  |  1.09    Ca    9.6      28 Sep 2020 06:44  Phos  3.1     09-28  Mg     2.0     09-28

## 2020-09-29 NOTE — PROGRESS NOTE ADULT - PROBLEM SELECTOR PLAN 2
Currently normotensive  -C/w pain control  -Restart Norvasc if pt becomes hypertensive.
BP increasing, on HCTZ  -C/w pain control  -Restart Norvasc
BP increasing, on HCTZ  -C/w pain control  -c/w Norvasc
Currently normotensive  -C/w pain control  -Restart Norvasc if pt becomes hypertensive.

## 2020-09-29 NOTE — PROGRESS NOTE ADULT - SUBJECTIVE AND OBJECTIVE BOX
O/N and interval events: None    Subjective:  Pt has no acute complaints. Denies skyler pain. Denies SOB/CP. ROS is otherwise negative.     Allergies    ACE inhibitors (Unknown)  Compazine (Unknown)    Intolerances    Home meds: Prozac, HCTZ 25 mg PO qd        MEDICATIONS  (STANDING):  acetaminophen  IVPB .. 1000 milliGRAM(s) IV Intermittent once  amLODIPine   Tablet 5 milliGRAM(s) Oral daily  FLUoxetine 60 milliGRAM(s) Oral daily  heparin   Injectable 5000 Unit(s) SubCutaneous every 8 hours  hydrochlorothiazide 25 milliGRAM(s) Oral daily  melatonin 3 milliGRAM(s) Oral at bedtime  pantoprazole    Tablet 40 milliGRAM(s) Oral before breakfast  senna 2 Tablet(s) Oral daily    MEDICATIONS  (PRN):  acetaminophen   Tablet .. 650 milliGRAM(s) Oral every 6 hours PRN Mild Pain (1 - 3)  ondansetron Injectable 4 milliGRAM(s) IV Push every 6 hours PRN Nausea  oxyCODONE    IR 5 milliGRAM(s) Oral every 4 hours PRN Moderate Pain (4 - 6)  oxyCODONE    IR 10 milliGRAM(s) Oral every 6 hours PRN Severe Pain (7 - 10)  zolpidem 10 milliGRAM(s) Oral at bedtime PRN Insomnia          Drug Dosing Weight  Height (cm): 172.7 (21 Sep 2020 10:30)  Weight (kg): 91.6 (21 Sep 2020 10:30)  BMI (kg/m2): 30.7 (21 Sep 2020 10:30)  BSA (m2): 2.05 (21 Sep 2020 10:30)    PAST MEDICAL & SURGICAL HISTORY:  GERD (gastroesophageal reflux disease)    Depression    HTN (hypertension)    History of Nissen fundoplication        FAMILY HISTORY:  no reported cardiac history reported    SOCIAL HISTORY:  social EtOH use, no smoking, no drug use    Vital Signs Last 24 Hrs  T(C): 36.9 (29 Sep 2020 08:59), Max: 37 (29 Sep 2020 05:40)  T(F): 98.4 (29 Sep 2020 08:59), Max: 98.6 (29 Sep 2020 05:40)  HR: 68 (29 Sep 2020 08:59) (64 - 70)  BP: 127/74 (29 Sep 2020 08:59) (118/89 - 129/85)  BP(mean): 100 (29 Sep 2020 05:40) (100 - 100)  RR: 18 (29 Sep 2020 08:59) (18 - 18)  SpO2: 99% (29 Sep 2020 08:59) (97% - 99%)      PHYSICAL EXAM:      Constitutional: NAD  Eyes: PERRLA  ENMT: MMM  Neck: supple  Back: midline  Respiratory: CTA b/l  Cardiovascular: rrr, s1s2, no m/r/g  Gastrointestinal: soft, distended, mild TTP, RAN drain +  Extremities: wwp  Vascular: + 2 pulses radial  Neurological: AAO x 4  Skin: no rash  Lymph Nodes: no LAD  Musculoskeletal: no joint swelling  Psychiatric: normal affect    LABS:                          11.9   11.27 )-----------( 257      ( 29 Sep 2020 06:58 )             36.7   09-29    137  |  100  |  8   ----------------------------<  122<H>  4.2   |  26  |  1.10    Ca    9.1      29 Sep 2020 06:58  Phos  3.1     09-29  Mg     2.2     09-29                    EKG:    ECHO, US:    RADIOLOGY:

## 2020-09-29 NOTE — DISCHARGE NOTE NURSING/CASE MANAGEMENT/SOCIAL WORK - PATIENT PORTAL LINK FT
You can access the FollowMyHealth Patient Portal offered by [facility] by registering at the following website: [email or web address]. By joining [facility]’s FollowMyHealth portal, you will also be able to view your health information using other applications (apps) compatible with our system.

## 2020-09-29 NOTE — DISCHARGE NOTE NURSING/CASE MANAGEMENT/SOCIAL WORK - NSDCFUADDAPPT_GEN_ALL_CORE_FT
Please follow-up with Dr. Mccartney in about 1 week at his office. You can call his office ot make an appointment at [phone].

## 2020-09-29 NOTE — PROGRESS NOTE ADULT - ASSESSMENT
70F w/ PMHx of HTN, depression, GERD (s/p laparoscopic Nissen fundoplication in 2012 at [place]) presents with sharp abdominal pain following outpatient colonoscopy this morning with clinical picture with concern for perforation now s/p diagnostic laparoscopy, YONATAN, repair of sigmoid colon primarily.
70F w/ PMHx of HTN, depression, GERD (s/p laparoscopic Nissen fundoplication in 2012 at [place]) presents with sharp abdominal pain following outpatient colonoscopy this morning with clinical picture with concern for perforation now s/p diagnostic laparoscopy, YONATAN, repair of sigmoid colon primarily.
70F w/ PMHx of HTN, depression, GERD (s/p laparoscopic Nissen fundoplication in 2012 at [place]) presents with sharp abdominal pain following outpatient colonoscopy this morning. Now POD 0 s/p diagnostic lap w primary repair of sigmoid perforation.     -Pain/nausea control PRN  -NPO, IVF  -Zosyn  -SQH, SCDs, OOB/A, IS  -AM labs  -RAN x 1  
70F w/ PMHx of HTN, depression, GERD (s/p laparoscopic Nissen fundoplication in 2012 at [place]) presents with sharp abdominal pain following outpatient colonoscopy this morning. Afebrile, HDS. Clinical picture with concern for perforation now s/p diagnostic laparoscopy, YONATAN, repair of sigmoid colon primarily.    -NPO/IVF  -Pain/nausea control PRN  -Zosyn  -RAN x 1  -Sumatriptan 50 PRN migraine   -Home meds as appropriate: holding HCTZ  -SQH, SCDs, OOB/A, IS  -AM labs  
70F w/ PMHx of HTN, depression, GERD (s/p laparoscopic Nissen fundoplication in 2012 at [place]) presents with sharp abdominal pain following outpatient colonoscopy this morning with clinical picture with concern for perforation now s/p diagnostic laparoscopy, YONATAN, repair of sigmoid colon primarily.
70F w/ PMHx of HTN, depression, GERD (s/p laparoscopic Nissen fundoplication in 2012 at [place]) presents with sharp abdominal pain following outpatient colonoscopy this morning. Afebrile, HDS. Clinical picture with concern for perforation now s/p diagnostic laparoscopy, YONATAN, repair of sigmoid colon primarily.    -CLD/IVF  -Pain/nausea control PRN  -Ceftriaxone/flagyl  -RNA x 1  -Sumatriptan 50 PRN migraine   -Home meds as appropriate: holding HCTZ  -SQH, SCDs, OOB/A, IS  -AM labs  
70F w/ PMHx of HTN, depression, GERD (s/p laparoscopic Nissen fundoplication in 2012 at [place]) presents with sharp abdominal pain following outpatient colonoscopy this morning with clinical picture with concern for perforation now s/p diagnostic laparoscopy, YONATAN, repair of sigmoid colon primarily.
70F w/ PMHx of HTN, depression, GERD (s/p laparoscopic Nissen fundoplication in 2012 at [place]) presents with sharp abdominal pain following outpatient colonoscopy this morning with clinical picture with concern for perforation now s/p diagnostic laparoscopy, YONATAN, repair of sigmoid colon primarily.
70F w/ PMHx of HTN, depression, GERD (s/p laparoscopic Nissen fundoplication in 2012 at [place]) presents with sharp abdominal pain following outpatient colonoscopy this morning. Afebrile, HDS. Clinical picture with concern for perforation now s/p diagnostic laparoscopy, YONATAN, repair of sigmoid colon primarily. Toleratinhg CLD, passing flatus, decreased distension but nauseous, awaiting BMs.     -CLD  -Pain/nausea control PRN  -Ceftriaxone/flagyl  -Sumatriptan 50 PRN migraine   -Home meds  -SQH, SCDs, OOB/A, IS  -AM labs  
70F w/ PMHx of HTN, depression, GERD (s/p laparoscopic Nissen fundoplication in 2012 at [place]) presents with sharp abdominal pain following outpatient colonoscopy this morning with clinical picture with concern for perforation now s/p diagnostic laparoscopy, YONATAN, repair of sigmoid colon primarily.
70F w/ PMHx of HTN, depression, GERD (s/p laparoscopic Nissen fundoplication in 2012 at [place]) presents with sharp abdominal pain following outpatient colonoscopy. Clinical picture with concern for perforation now s/p diagnostic laparoscopy, YONATAN, repair of sigmoid colon primarily. Distended with flatus today.     -NPO/IVF  -Pain/nausea control PRN  -Zosyn  -RAN x 1  -SQH, SCDs, OOB/A, IS  -AM labs    Plan discussed with attending and chief resident.   ____________________________________________________  Arely Lauren MD     PGY1 - Surgery Team 4  
70F w/ PMHx of HTN, depression, GERD (s/p laparoscopic Nissen fundoplication in 2012 at [place]) presents with sharp abdominal pain following outpatient colonoscopy this morning with clinical picture with concern for perforation now s/p diagnostic laparoscopy, YONATAN, repair of sigmoid colon primarily.
70F w/ PMHx of HTN, depression, GERD (s/p laparoscopic Nissen fundoplication in 2012 at [place]) presents with sharp abdominal pain following outpatient colonoscopy this morning. Afebrile, HDS. Clinical picture with concern for perforation now s/p diagnostic laparoscopy, YONATAN, repair of sigmoid colon primarily    -CLD  -Pain/nausea control PRN  -Sumatriptan 50 PRN migraine   -Home meds  -SQH, SCDs, OOB/A, IS  -AM labs  
70F w/ PMHx of HTN, depression, GERD (s/p laparoscopic Nissen fundoplication in 2012 at [place]) presents with sharp abdominal pain following outpatient colonoscopy this morning. Afebrile, HDS. Clinical picture with concern for perforation now s/p diagnostic laparoscopy, YONATAN, repair of sigmoid colon primarily. Toleratinhg CLD, passing flatus, decreased distension but nauseous, awaiting BMs.     -Pain/nausea control PRN  -Ceftriaxone/flagyl  -Sumatriptan 50 PRN migraine   -Home meds  -SQH, SCDs, OOB/A, IS  -AM labs  - Advanced diet to LRD  
70F w/ PMHx of HTN, depression, GERD (s/p laparoscopic Nissen fundoplication in 2012 at [place]) presents with sharp abdominal pain following outpatient colonoscopy this morning. Afebrile, HDS. Clinical picture with concern for perforation now s/p diagnostic laparoscopy, YONATAN, repair of sigmoid colon primarily.    -CLD/IVF  -Pain/nausea control PRN  -Ceftriaxone/flagyl  -RAN x 1  -Sumatriptan 50 PRN migraine   -Home meds  -SQH, SCDs, OOB/A, IS  -AM labs  
70F w/ PMHx of HTN, depression, GERD (s/p laparoscopic Nissen fundoplication in 2012 at [place]) presents with sharp abdominal pain following outpatient colonoscopy this morning. Afebrile, HDS. Clinical picture with concern for perforation now s/p diagnostic laparoscopy, YONATAN, repair of sigmoid colon primarily. Tolerating CLD, passing flatus, decreased distension but nauseous, awaiting BMs.     -CLD  -Pain/nausea control PRN  -Ceftriaxone/flagyl  -Sumatriptan 50 PRN migraine   -Home meds  -SQH, SCDs, OOB/A, IS  -AM labs    Plan discussed with attending and chief resident.   ____________________________________________________  Arely Lauren MD     PGY1 - Surgery Team 4

## 2020-10-02 PROBLEM — F32.9 MAJOR DEPRESSIVE DISORDER, SINGLE EPISODE, UNSPECIFIED: Chronic | Status: ACTIVE | Noted: 2020-09-21

## 2020-10-02 PROBLEM — Z00.00 ENCOUNTER FOR PREVENTIVE HEALTH EXAMINATION: Status: ACTIVE | Noted: 2020-10-02

## 2020-10-02 PROBLEM — K21.9 GASTRO-ESOPHAGEAL REFLUX DISEASE WITHOUT ESOPHAGITIS: Chronic | Status: ACTIVE | Noted: 2020-09-21

## 2020-10-02 PROBLEM — I10 ESSENTIAL (PRIMARY) HYPERTENSION: Chronic | Status: ACTIVE | Noted: 2020-09-21

## 2020-10-06 ENCOUNTER — APPOINTMENT (OUTPATIENT)
Dept: BARIATRICS | Facility: CLINIC | Age: 70
End: 2020-10-06

## 2020-10-06 VITALS
WEIGHT: 200.38 LBS | BODY MASS INDEX: 31.82 KG/M2 | TEMPERATURE: 97.6 F | HEART RATE: 67 BPM | DIASTOLIC BLOOD PRESSURE: 97 MMHG | HEIGHT: 66.5 IN | SYSTOLIC BLOOD PRESSURE: 136 MMHG | OXYGEN SATURATION: 98 %

## 2020-10-07 DIAGNOSIS — E87.6 HYPOKALEMIA: ICD-10-CM

## 2020-10-07 DIAGNOSIS — K91.71 ACCIDENTAL PUNCTURE AND LACERATION OF A DIGESTIVE SYSTEM ORGAN OR STRUCTURE DURING A DIGESTIVE SYSTEM PROCEDURE: ICD-10-CM

## 2020-10-07 DIAGNOSIS — E66.9 OBESITY, UNSPECIFIED: ICD-10-CM

## 2020-10-07 DIAGNOSIS — D64.9 ANEMIA, UNSPECIFIED: ICD-10-CM
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
## 2020-10-07 DIAGNOSIS — D69.6 THROMBOCYTOPENIA, UNSPECIFIED: ICD-10-CM

## 2020-10-07 DIAGNOSIS — D72.829 ELEVATED WHITE BLOOD CELL COUNT, UNSPECIFIED: ICD-10-CM

## 2020-10-07 DIAGNOSIS — I10 ESSENTIAL (PRIMARY) HYPERTENSION: ICD-10-CM

## 2020-10-07 DIAGNOSIS — K63.1 PERFORATION OF INTESTINE (NONTRAUMATIC): ICD-10-CM

## 2020-10-07 DIAGNOSIS — F32.9 MAJOR DEPRESSIVE DISORDER, SINGLE EPISODE, UNSPECIFIED: ICD-10-CM

## 2020-10-07 DIAGNOSIS — K21.9 GASTRO-ESOPHAGEAL REFLUX DISEASE WITHOUT ESOPHAGITIS: ICD-10-CM

## 2021-03-01 ENCOUNTER — APPOINTMENT (OUTPATIENT)
Age: 71
End: 2021-03-01
Payer: MEDICARE

## 2021-03-01 VITALS
HEART RATE: 73 BPM | OXYGEN SATURATION: 98 % | DIASTOLIC BLOOD PRESSURE: 90 MMHG | TEMPERATURE: 97.6 F | SYSTOLIC BLOOD PRESSURE: 139 MMHG | BODY MASS INDEX: 32.14 KG/M2 | RESPIRATION RATE: 15 BRPM | HEIGHT: 66 IN | WEIGHT: 200 LBS

## 2021-03-01 DIAGNOSIS — M25.9 JOINT DISORDER, UNSPECIFIED: ICD-10-CM

## 2021-03-01 DIAGNOSIS — Z86.59 PERSONAL HISTORY OF OTHER MENTAL AND BEHAVIORAL DISORDERS: ICD-10-CM

## 2021-03-01 DIAGNOSIS — Z86.79 PERSONAL HISTORY OF OTHER DISEASES OF THE CIRCULATORY SYSTEM: ICD-10-CM

## 2021-03-01 DIAGNOSIS — Z78.9 OTHER SPECIFIED HEALTH STATUS: ICD-10-CM

## 2021-03-01 DIAGNOSIS — K64.9 UNSPECIFIED HEMORRHOIDS: ICD-10-CM

## 2021-03-01 DIAGNOSIS — Z87.19 PERSONAL HISTORY OF OTHER DISEASES OF THE DIGESTIVE SYSTEM: ICD-10-CM

## 2021-03-01 DIAGNOSIS — K46.9 UNSPECIFIED ABDOMINAL HERNIA W/OUT OBSTRUCTION OR GANGRENE: ICD-10-CM

## 2021-03-01 DIAGNOSIS — Z72.89 OTHER PROBLEMS RELATED TO LIFESTYLE: ICD-10-CM

## 2021-03-01 DIAGNOSIS — K30 FUNCTIONAL DYSPEPSIA: ICD-10-CM

## 2021-03-01 DIAGNOSIS — Z83.71 FAMILY HISTORY OF COLONIC POLYPS: ICD-10-CM

## 2021-03-01 DIAGNOSIS — R12 HEARTBURN: ICD-10-CM

## 2021-03-01 PROCEDURE — 99072 ADDL SUPL MATRL&STAF TM PHE: CPT

## 2021-03-01 PROCEDURE — 99203 OFFICE O/P NEW LOW 30 MIN: CPT

## 2021-03-01 RX ORDER — HYDROCHLOROTHIAZIDE 25 MG/1
25 TABLET ORAL
Refills: 0 | Status: ACTIVE | COMMUNITY

## 2021-03-01 RX ORDER — FLUOCINOLONE ACETONIDE 0.25 MG/G
0.03 OINTMENT TOPICAL
Refills: 0 | Status: ACTIVE | COMMUNITY

## 2021-03-01 RX ORDER — FLUTICASONE PROPIONATE 50 MCG
50 SPRAY, SUSPENSION NASAL
Refills: 0 | Status: ACTIVE | COMMUNITY

## 2021-03-01 RX ORDER — ESTRADIOL 0.1 MG/G
CREAM VAGINAL
Refills: 0 | Status: ACTIVE | COMMUNITY

## 2021-03-01 RX ORDER — FLUOXETINE HCL 10 MG
TABLET ORAL
Refills: 0 | Status: ACTIVE | COMMUNITY

## 2021-03-01 RX ORDER — ZOLPIDEM TARTRATE 12.5 MG/1
12.5 TABLET, EXTENDED RELEASE ORAL
Refills: 0 | Status: ACTIVE | COMMUNITY

## 2021-03-03 NOTE — ASSESSMENT
[FreeTextEntry1] : 70F with PMHx HTN, depression, GERD, and hiatal hernia s/p Nissen fundoplication in 2008 referred to schedule repeat colonoscopy.  \par \par Colonoscopy consult \par - schedule patient for colonoscopy to follow up on unfinished procedure, r/a/i/b discussed and patient agreeable\par - will do magnesium citrate cleanse which is lower volume than previous prep that was bothersome to patient \par \par Chronic constipation\par - prescribed Linzess 145 mcg since significant history of constipation, take in the morning 30 minutes before meals \par - advised patient to inform office if gets diarrhea therefore can lower dose or if not producing enough BM then can increase dosage\par \par f/u post procedure \par \par Rupa Rizo, NP

## 2021-03-03 NOTE — END OF VISIT
[FreeTextEntry3] : 30 mins FTF to review chronic constipation role for completion colonosocpy and mgmt [Time Spent: ___ minutes] : I have spent [unfilled] minutes of time on the encounter.

## 2021-03-03 NOTE — HISTORY OF PRESENT ILLNESS
[de-identified] : 70F with PMHx HTN, depression, GERD, and hiatal hernia s/p Nissen fundoplication in 2008 referred to schedule repeat colonoscopy.  \par \par Patient reports that in March 2020 began to have pain on left side of abdomen (points to flank area) and scheduled colonoscopy because of that. Procedure was performed by Dr. Finkelstein and he was unable to finish because "punctured colon". Colon perforation occurred during procedure therefore she had to go to [facility] and get laparoscopic sigmoid colon repair. She needed RAN drain for 2-3 days.\par \par She is feeling well today. She reports chronic constipation for many years- was taking milk of magnesia but does not want to take that anymore. Will not move bowels for 2-3 days if does not take anything and will get a terrible headache. Has tried Linzess in the past and really worked. \par used the gallon prep for colonoscopy and does not want to do that this time around \par occasional rectal bleeding from hemorrhoids \par \par FHX: no GI cancers

## 2021-03-29 DIAGNOSIS — Z01.812 ENCOUNTER FOR PREPROCEDURAL LABORATORY EXAMINATION: ICD-10-CM

## 2021-04-02 ENCOUNTER — APPOINTMENT (OUTPATIENT)
Age: 71
End: 2021-04-02

## 2021-04-21 LAB — SARS-COV-2 N GENE NPH QL NAA+PROBE: NOT DETECTED

## 2021-04-23 ENCOUNTER — APPOINTMENT (OUTPATIENT)
Age: 71
End: 2021-04-23
Payer: MEDICARE

## 2021-04-23 PROCEDURE — 45378 DIAGNOSTIC COLONOSCOPY: CPT | Mod: 53,PT

## 2021-04-30 ENCOUNTER — RX RENEWAL (OUTPATIENT)
Age: 71
End: 2021-04-30

## 2021-04-30 RX ORDER — LINACLOTIDE 145 UG/1
145 CAPSULE, GELATIN COATED ORAL
Qty: 30 | Refills: 1 | Status: ACTIVE | COMMUNITY
Start: 2021-03-01 | End: 1900-01-01

## 2021-12-16 NOTE — ED ADULT NURSE NOTE - NURSING GU BLADDER
----- Message from Susana Chakraborty MD sent at 12/16/2021 12:03 PM CST -----  Please update patient that all her recent screening labs are normal  Slightly low TSH level, I do not want to change her medication dosage for levothyroxine, repeat TSH level in 3 months.
Left detailed message for patient with results and recommendations. Instructed pt to call back if pt has any further questions. Patient would like communication of their results via:   Cell Phone: Telephone Information:  Mobile  to leave a message containing results?  Yes
non-tender/non-distended

## 2022-03-11 ENCOUNTER — OUTPATIENT (OUTPATIENT)
Dept: OUTPATIENT SERVICES | Facility: HOSPITAL | Age: 72
LOS: 1 days | End: 2022-03-11
Payer: MEDICARE

## 2022-03-11 ENCOUNTER — APPOINTMENT (OUTPATIENT)
Dept: CT IMAGING | Facility: HOSPITAL | Age: 72
End: 2022-03-11

## 2022-03-11 DIAGNOSIS — Z98.890 OTHER SPECIFIED POSTPROCEDURAL STATES: Chronic | ICD-10-CM

## 2022-03-13 PROCEDURE — 82565 ASSAY OF CREATININE: CPT

## 2022-03-13 PROCEDURE — 75574 CT ANGIO HRT W/3D IMAGE: CPT | Mod: 26

## 2022-03-13 PROCEDURE — 75574 CT ANGIO HRT W/3D IMAGE: CPT

## 2022-08-09 ENCOUNTER — APPOINTMENT (OUTPATIENT)
Age: 72
End: 2022-08-09

## 2022-08-09 VITALS
SYSTOLIC BLOOD PRESSURE: 110 MMHG | OXYGEN SATURATION: 97 % | RESPIRATION RATE: 14 BRPM | HEIGHT: 66 IN | BODY MASS INDEX: 32.14 KG/M2 | DIASTOLIC BLOOD PRESSURE: 82 MMHG | HEART RATE: 77 BPM | TEMPERATURE: 96.5 F | WEIGHT: 200 LBS

## 2022-08-09 DIAGNOSIS — Z12.12 ENCOUNTER FOR SCREENING FOR MALIGNANT NEOPLASM OF COLON: ICD-10-CM

## 2022-08-09 DIAGNOSIS — R10.9 UNSPECIFIED ABDOMINAL PAIN: ICD-10-CM

## 2022-08-09 DIAGNOSIS — K59.00 CONSTIPATION, UNSPECIFIED: ICD-10-CM

## 2022-08-09 DIAGNOSIS — Z12.11 ENCOUNTER FOR SCREENING FOR MALIGNANT NEOPLASM OF COLON: ICD-10-CM

## 2022-08-09 PROCEDURE — 99214 OFFICE O/P EST MOD 30 MIN: CPT

## 2022-08-10 NOTE — HISTORY OF PRESENT ILLNESS
[de-identified] : 71F with PMHx HTN, depression, GERD, and hiatal hernia s/p Nissen fundoplication in 2008, history of endometriosis, fibroids s/p hysterectomy here for follow up of ongoing abdominal pain. \par \par Patient notes since her last visit, she continues to have ongoing b/l lower abdominal pain, bloating. Notes that her abdomen feels somewhat better after having a BM but notes that she still has to sit on the toilet for 30-45 minutes at a time to feel fully evacuated. No blood in the stool but notes her BMs to be very soft in consistency, Bowman 6-7 mostly. Previously a 2. However notes that prior to starting the Linzess, she was on Milk of Magnesia and actually feels much happier about her BMs at this time. Today, notes feeling more bothered by her persistent lower abdominal pain, which has been ongoing for approximately 5 years. She would like to know if she needs to see a surgeon to address her likely adhesions. \par \par Colonoscopy (4/23/21) - scar in the sigmoid colon, remainder of exam otherwise normal. No specimens collected. \par \par Previous history:\par Sept 2021 - colonoscopy with Dr Finkelstein resulted in perforated sigmoid. Underwent laparoscopic sigmoid colon repair with Dr Mccartney. \par \par FHX: no GI cancers. \par

## 2022-08-10 NOTE — ASSESSMENT
[FreeTextEntry1] : 71F with PMHx HTN, depression, GERD, and hiatal hernia s/p Nissen fundoplication in 2008, history of endometriosis, fibroids s/p hysterectomy here for follow up of ongoing abdominal pain. \par \par #Abdominal Pain\par #Chronic Constipation\par #History of multiple previous abdominal surgeries\par Long-standing history of b/l lower abdominal pain, chronic constipation, no signs of obstruction, constipation likely multifactorial in etiology given multiple previous abdominal surgeries, likely with adhesions. ddx also includes dyssynergic defecation (2 previous vaginal deliveries) vs drug induced (on fluoxetine) \par -Ordered for CT A/P with IV/PO contrast to further evaluate abdominal pain\par -Would defer on general surgery referral at this time pending abdominal imaging\par -Encouraged ongoing follow up with gynecologist given known history of endometriosis\par -c/w Linzess for now\par \par #Colorectal Cancer Screening\par Colonoscopy (4/23/21) - scar in the sigmoid colon, remainder of exam otherwise normal. No specimens collected. \par -due in 10 yrs from last colonoscopy in 2031\par \par Lola Tsang DO\par Gastroenterology Fellow\par

## 2022-08-10 NOTE — PHYSICAL EXAM
[General Appearance - Alert] : alert [General Appearance - In No Acute Distress] : in no acute distress [Sclera] : the sclera and conjunctiva were normal [PERRL With Normal Accommodation] : pupils were equal in size, round, and reactive to light [Extraocular Movements] : extraocular movements were intact [Outer Ear] : the ears and nose were normal in appearance [Oropharynx] : the oropharynx was normal [Neck Appearance] : the appearance of the neck was normal [Neck Cervical Mass (___cm)] : no neck mass was observed [Jugular Venous Distention Increased] : there was no jugular-venous distention [Thyroid Diffuse Enlargement] : the thyroid was not enlarged [Thyroid Nodule] : there were no palpable thyroid nodules [Exaggerated Use Of Accessory Muscles For Inspiration] : no accessory muscle use [Full Pulse] : the pedal pulses are present [Edema] : there was no peripheral edema [Abdomen Soft] : soft [Abdomen Tenderness] : non-tender [Abdomen Mass (___ Cm)] : no abdominal mass palpated [Cervical Lymph Nodes Enlarged Posterior Bilaterally] : posterior cervical [Cervical Lymph Nodes Enlarged Anterior Bilaterally] : anterior cervical [Supraclavicular Lymph Nodes Enlarged Bilaterally] : supraclavicular [Axillary Lymph Nodes Enlarged Bilaterally] : axillary [Femoral Lymph Nodes Enlarged Bilaterally] : femoral [Inguinal Lymph Nodes Enlarged Bilaterally] : inguinal [No CVA Tenderness] : no ~M costovertebral angle tenderness [No Spinal Tenderness] : no spinal tenderness [Abnormal Walk] : normal gait [Nail Clubbing] : no clubbing  or cyanosis of the fingernails [Musculoskeletal - Swelling] : no joint swelling seen [Motor Tone] : muscle strength and tone were normal [Skin Color & Pigmentation] : normal skin color and pigmentation [Skin Turgor] : normal skin turgor [] : no rash [Deep Tendon Reflexes (DTR)] : deep tendon reflexes were 2+ and symmetric [Sensation] : the sensory exam was normal to light touch and pinprick [No Focal Deficits] : no focal deficits [Oriented To Time, Place, And Person] : oriented to person, place, and time [Impaired Insight] : insight and judgment were intact [Affect] : the affect was normal [FreeTextEntry1] : obese abdomen

## 2022-08-10 NOTE — REVIEW OF SYSTEMS
[Abdominal Pain] : abdominal pain [Constipation] : constipation [As Noted in HPI] : as noted in HPI [Negative] : Heme/Lymph [Vomiting] : no vomiting [Diarrhea] : no diarrhea [Heartburn] : no heartburn [Melena] : no melena

## 2022-08-10 NOTE — HISTORY OF PRESENT ILLNESS
[de-identified] : 71F with PMHx HTN, depression, GERD, and hiatal hernia s/p Nissen fundoplication in 2008, history of endometriosis, fibroids s/p hysterectomy here for follow up of ongoing abdominal pain. \par \par Patient notes since her last visit, she continues to have ongoing b/l lower abdominal pain, bloating. Notes that her abdomen feels somewhat better after having a BM but notes that she still has to sit on the toilet for 30-45 minutes at a time to feel fully evacuated. No blood in the stool but notes her BMs to be very soft in consistency, Stella 6-7 mostly. Previously a 2. However notes that prior to starting the Linzess, she was on Milk of Magnesia and actually feels much happier about her BMs at this time. Today, notes feeling more bothered by her persistent lower abdominal pain, which has been ongoing for approximately 5 years. She would like to know if she needs to see a surgeon to address her likely adhesions. \par \par Colonoscopy (4/23/21) - scar in the sigmoid colon, remainder of exam otherwise normal. No specimens collected. \par \par Previous history:\par Sept 2021 - colonoscopy with Dr Finkelstein resulted in perforated sigmoid. Underwent laparoscopic sigmoid colon repair with Dr Mccartney. \par \par FHX: no GI cancers. \par

## 2022-11-24 ENCOUNTER — NON-APPOINTMENT (OUTPATIENT)
Age: 72
End: 2022-11-24

## 2023-04-14 ENCOUNTER — INPATIENT (INPATIENT)
Facility: HOSPITAL | Age: 73
LOS: 1 days | Discharge: HOME | DRG: 312 | End: 2023-04-16
Attending: INTERNAL MEDICINE | Admitting: INTERNAL MEDICINE
Payer: MEDICARE

## 2023-04-14 VITALS
HEIGHT: 66 IN | RESPIRATION RATE: 18 BRPM | TEMPERATURE: 99 F | OXYGEN SATURATION: 98 % | SYSTOLIC BLOOD PRESSURE: 135 MMHG | DIASTOLIC BLOOD PRESSURE: 86 MMHG | HEART RATE: 69 BPM | WEIGHT: 199.96 LBS

## 2023-04-14 DIAGNOSIS — Z98.890 OTHER SPECIFIED POSTPROCEDURAL STATES: Chronic | ICD-10-CM

## 2023-04-14 DIAGNOSIS — R55 SYNCOPE AND COLLAPSE: ICD-10-CM

## 2023-04-14 DIAGNOSIS — K63.1 PERFORATION OF INTESTINE (NONTRAUMATIC): Chronic | ICD-10-CM

## 2023-04-14 DIAGNOSIS — M25.511 PAIN IN RIGHT SHOULDER: ICD-10-CM

## 2023-04-14 DIAGNOSIS — F32.9 MAJOR DEPRESSIVE DISORDER, SINGLE EPISODE, UNSPECIFIED: ICD-10-CM

## 2023-04-14 DIAGNOSIS — E78.5 HYPERLIPIDEMIA, UNSPECIFIED: ICD-10-CM

## 2023-04-14 DIAGNOSIS — I10 ESSENTIAL (PRIMARY) HYPERTENSION: ICD-10-CM

## 2023-04-14 DIAGNOSIS — Z96.652 PRESENCE OF LEFT ARTIFICIAL KNEE JOINT: Chronic | ICD-10-CM

## 2023-04-14 LAB
ANION GAP SERPL CALC-SCNC: 10 MMOL/L — SIGNIFICANT CHANGE UP (ref 5–17)
APTT BLD: 32.7 SEC — SIGNIFICANT CHANGE UP (ref 27.5–35.5)
BASOPHILS # BLD AUTO: 0.03 K/UL — SIGNIFICANT CHANGE UP (ref 0–0.2)
BASOPHILS NFR BLD AUTO: 0.4 % — SIGNIFICANT CHANGE UP (ref 0–2)
BUN SERPL-MCNC: 11 MG/DL — SIGNIFICANT CHANGE UP (ref 7–23)
CALCIUM SERPL-MCNC: 8.9 MG/DL — SIGNIFICANT CHANGE UP (ref 8.4–10.5)
CHLORIDE SERPL-SCNC: 106 MMOL/L — SIGNIFICANT CHANGE UP (ref 96–108)
CO2 SERPL-SCNC: 25 MMOL/L — SIGNIFICANT CHANGE UP (ref 22–31)
CREAT SERPL-MCNC: 1.05 MG/DL — SIGNIFICANT CHANGE UP (ref 0.5–1.3)
EGFR: 57 ML/MIN/1.73M2 — LOW
EOSINOPHIL # BLD AUTO: 0.16 K/UL — SIGNIFICANT CHANGE UP (ref 0–0.5)
EOSINOPHIL NFR BLD AUTO: 2.2 % — SIGNIFICANT CHANGE UP (ref 0–6)
GLUCOSE SERPL-MCNC: 91 MG/DL — SIGNIFICANT CHANGE UP (ref 70–99)
HCT VFR BLD CALC: 39.9 % — SIGNIFICANT CHANGE UP (ref 34.5–45)
HGB BLD-MCNC: 12.7 G/DL — SIGNIFICANT CHANGE UP (ref 11.5–15.5)
IMM GRANULOCYTES NFR BLD AUTO: 0.1 % — SIGNIFICANT CHANGE UP (ref 0–0.9)
INR BLD: 1.08 — SIGNIFICANT CHANGE UP (ref 0.88–1.16)
LYMPHOCYTES # BLD AUTO: 1.97 K/UL — SIGNIFICANT CHANGE UP (ref 1–3.3)
LYMPHOCYTES # BLD AUTO: 27.5 % — SIGNIFICANT CHANGE UP (ref 13–44)
MCHC RBC-ENTMCNC: 27.8 PG — SIGNIFICANT CHANGE UP (ref 27–34)
MCHC RBC-ENTMCNC: 31.8 GM/DL — LOW (ref 32–36)
MCV RBC AUTO: 87.3 FL — SIGNIFICANT CHANGE UP (ref 80–100)
MONOCYTES # BLD AUTO: 0.58 K/UL — SIGNIFICANT CHANGE UP (ref 0–0.9)
MONOCYTES NFR BLD AUTO: 8.1 % — SIGNIFICANT CHANGE UP (ref 2–14)
NEUTROPHILS # BLD AUTO: 4.42 K/UL — SIGNIFICANT CHANGE UP (ref 1.8–7.4)
NEUTROPHILS NFR BLD AUTO: 61.7 % — SIGNIFICANT CHANGE UP (ref 43–77)
NRBC # BLD: 0 /100 WBCS — SIGNIFICANT CHANGE UP (ref 0–0)
PLATELET # BLD AUTO: 171 K/UL — SIGNIFICANT CHANGE UP (ref 150–400)
POTASSIUM SERPL-MCNC: SIGNIFICANT CHANGE UP (ref 3.5–5.3)
POTASSIUM SERPL-SCNC: SIGNIFICANT CHANGE UP (ref 3.5–5.3)
PROTHROM AB SERPL-ACNC: 12.9 SEC — SIGNIFICANT CHANGE UP (ref 10.5–13.4)
RBC # BLD: 4.57 M/UL — SIGNIFICANT CHANGE UP (ref 3.8–5.2)
RBC # FLD: 15.9 % — HIGH (ref 10.3–14.5)
SARS-COV-2 RNA SPEC QL NAA+PROBE: NEGATIVE — SIGNIFICANT CHANGE UP
SODIUM SERPL-SCNC: 141 MMOL/L — SIGNIFICANT CHANGE UP (ref 135–145)
TROPONIN T SERPL-MCNC: 0.01 NG/ML — SIGNIFICANT CHANGE UP (ref 0–0.01)
TROPONIN T SERPL-MCNC: 0.01 NG/ML — SIGNIFICANT CHANGE UP (ref 0–0.01)
WBC # BLD: 7.17 K/UL — SIGNIFICANT CHANGE UP (ref 3.8–10.5)
WBC # FLD AUTO: 7.17 K/UL — SIGNIFICANT CHANGE UP (ref 3.8–10.5)

## 2023-04-14 PROCEDURE — 71046 X-RAY EXAM CHEST 2 VIEWS: CPT | Mod: 26

## 2023-04-14 PROCEDURE — 99285 EMERGENCY DEPT VISIT HI MDM: CPT

## 2023-04-14 PROCEDURE — 73030 X-RAY EXAM OF SHOULDER: CPT | Mod: 26,50

## 2023-04-14 RX ORDER — METOPROLOL TARTRATE 50 MG
25 TABLET ORAL DAILY
Refills: 0 | Status: DISCONTINUED | OUTPATIENT
Start: 2023-04-14 | End: 2023-04-16

## 2023-04-14 RX ORDER — LINACLOTIDE 145 UG/1
1 CAPSULE, GELATIN COATED ORAL
Refills: 0 | DISCHARGE

## 2023-04-14 RX ORDER — METOPROLOL TARTRATE 50 MG
1 TABLET ORAL
Refills: 0 | DISCHARGE

## 2023-04-14 RX ORDER — PANTOPRAZOLE SODIUM 20 MG/1
1 TABLET, DELAYED RELEASE ORAL
Qty: 0 | Refills: 0 | DISCHARGE

## 2023-04-14 RX ORDER — ZOLPIDEM TARTRATE 10 MG/1
5 TABLET ORAL AT BEDTIME
Refills: 0 | Status: DISCONTINUED | OUTPATIENT
Start: 2023-04-14 | End: 2023-04-16

## 2023-04-14 RX ORDER — ACETAMINOPHEN 500 MG
650 TABLET ORAL ONCE
Refills: 0 | Status: COMPLETED | OUTPATIENT
Start: 2023-04-14 | End: 2023-04-14

## 2023-04-14 RX ORDER — HYDROCHLOROTHIAZIDE 25 MG
1 TABLET ORAL
Refills: 0 | DISCHARGE

## 2023-04-14 RX ORDER — ROSUVASTATIN CALCIUM 5 MG/1
1 TABLET ORAL
Refills: 0 | DISCHARGE

## 2023-04-14 RX ORDER — SUMATRIPTAN SUCCINATE 4 MG/.5ML
1 INJECTION, SOLUTION SUBCUTANEOUS
Qty: 0 | Refills: 0 | DISCHARGE

## 2023-04-14 RX ORDER — FLUOXETINE HCL 10 MG
40 CAPSULE ORAL DAILY
Refills: 0 | Status: DISCONTINUED | OUTPATIENT
Start: 2023-04-14 | End: 2023-04-16

## 2023-04-14 RX ORDER — ZOLPIDEM TARTRATE 10 MG/1
1 TABLET ORAL
Qty: 0 | Refills: 0 | DISCHARGE

## 2023-04-14 RX ORDER — ATORVASTATIN CALCIUM 80 MG/1
20 TABLET, FILM COATED ORAL AT BEDTIME
Refills: 0 | Status: DISCONTINUED | OUTPATIENT
Start: 2023-04-14 | End: 2023-04-16

## 2023-04-14 RX ORDER — HYDROCHLOROTHIAZIDE 25 MG
1 TABLET ORAL
Qty: 0 | Refills: 0 | DISCHARGE

## 2023-04-14 RX ORDER — FLUOXETINE HCL 10 MG
60 CAPSULE ORAL
Qty: 0 | Refills: 0 | DISCHARGE

## 2023-04-14 RX ORDER — QUETIAPINE FUMARATE 200 MG/1
1 TABLET, FILM COATED ORAL
Refills: 0 | DISCHARGE

## 2023-04-14 RX ORDER — QUETIAPINE FUMARATE 200 MG/1
50 TABLET, FILM COATED ORAL AT BEDTIME
Refills: 0 | Status: DISCONTINUED | OUTPATIENT
Start: 2023-04-14 | End: 2023-04-16

## 2023-04-14 RX ORDER — FLUOXETINE HCL 10 MG
1 CAPSULE ORAL
Refills: 0 | DISCHARGE

## 2023-04-14 RX ADMIN — Medication 650 MILLIGRAM(S): at 19:15

## 2023-04-14 RX ADMIN — Medication 40 MILLIGRAM(S): at 23:39

## 2023-04-14 RX ADMIN — ATORVASTATIN CALCIUM 20 MILLIGRAM(S): 80 TABLET, FILM COATED ORAL at 23:37

## 2023-04-14 RX ADMIN — ZOLPIDEM TARTRATE 5 MILLIGRAM(S): 10 TABLET ORAL at 23:42

## 2023-04-14 NOTE — PATIENT PROFILE ADULT - FOOD INSECURITY
Thanks for visiting us today!    Remember these important phone numbers:    [phone] for phone nurses during the day and our nurse answering service at night    [phone]  for scheduling or changing future appointments    [phone] for the Poison Control Center    When leaving a message for our staff, please include:   the spelling of your child’s full name and date of birth  your full name and relationship to child  best phone number and time to reach you   reason for the call        We strongly recommend that all children age 6 months and older receive the COVID-19 vaccine. Call our office at [phone] to schedule.        Is your child signed up for [facility]? If you do this, you can message us rather than playing phone tag! You can also look at labs, pay your bills, and do some scheduling. Go to your own account first. (If you don't have one yet, you can set one up at the website below or at your doctor's office).  Using a web browser (not the phone greg), on the right hand side of your page, click the button marked \"Request Access to my Child's Records.\" Fill out the information. In a few days your child's information will be linked to your account. It's that simple!! Here is the website for more information:     [email or web address]        --------------------------------------------------------------------------------------------------------------------       no

## 2023-04-14 NOTE — ED ADULT NURSE NOTE - OBJECTIVE STATEMENT
.  72years female alert mental state (AOX3) received on foot.  pt is ambulatory herself.  -complain of syncope.  Hx of GERD, emphysema. pt had syncope last night when she had dinner. pt states that pt had dizziness then syncope. pt presents chronic both shoulder pain.  -denied chest pain, SOB, weakness, sensory change, dizziness, fever, n/v/d, abdomen pain.  Pt is in the bed comfortably at this time. Will continue to monitor and document any changes.

## 2023-04-14 NOTE — PATIENT PROFILE ADULT - FALL HARM RISK - HARM RISK INTERVENTIONS

## 2023-04-14 NOTE — ED ADULT NURSE REASSESSMENT NOTE - NS ED NURSE REASSESS COMMENT FT1
Barby is a 8 m.o. female with:  1. Maternal Sjogren's syndrome with anti SSA and SSB antibodies and fetal heart block treated with prenatal steroids and IVIG without improvement  - maintained on isoproterenol drip until pacemaker placed 8/23/21  2. Delivered at 26w3d with weight of 500g due to severe fetal intrauterine growth restriction, poor biophysical profile, absent end diastolic blood flow and fetal heart block.   3. Tiny PDA  4. Severe lung disease with pulmonary hypertension requiring chronic therapy  - significant pulmonary venous desaturation on cath 12/2/21 (on 40% FiO2)  - long term sildenafil, on Bosentan as of 12/3 but held due to decompensation on 12/27 with increase to full dose  - s/p tracheostomy (12/14/21)  5. Persistent pericardial effusion post-op now s/p drainage of effusion and chest tube placement.   - pericardial window via left anterolateral thoracotomy 10/18/21 with placement of chest tube  - persistent drainage from chest tube   - chest tube pulled out without reaccumulation   6. Worsening respiratory status and hypoxia - transferred to CVICU on 12/1/21   7. No significant structural heart disease (PFO present, tiny PDA) with normal biventricular systolic function and no significant diastolic dysfunction  - no change in hemodynamics with AV pacing in cath lab  8. 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 The lung disease is her primary issue at present.  She was discussed at length at our cath conference on 12/3/21 and recommendations were made for aggressive pulmonary hypertension therapy and tracheostomy/home ventilator. She has no significant structural heart disease and her systolic function is normal, no evidence of materal lupus related cardiomyopathy or pacemaker related cardiomyopathy. She is now s/p trach  Received patient for continuous care.  Pending admit. and had tolerated home vent with stable diuretics regimen and oxygen requirement for a while, then with acute worsening with attempt to transition to EBM and hypoxia with increased Bosentan.    Plan:  Neuro:   - Methadone/ativan Q8 alternating, will work on weans per ICU, wean ativan today  - PT/OT, parents holding and involved     Resp:   - Ventilation plan: Doing well on home vent, teaching ongoing.   - likely colonization with Klebsiella.   - Goal sats > 90%, wean FiO2 as tolerated for goal sats  - Trach with vent  - Albuterol Q8, budesonide bid     CVS:  - On sildenafil for pulmonary hypertension, bosentan added 12/3. Reached 4kg so Bosentan increased to 16mg BID on 1/24 - changed back to 8 mg in case the hypoxia was related to worsening VQ mismatch - holding as of 1/27 pm due to worsened hypoxia  - Echocardiogram 1/31/22 unchanged off Bosentan with echocardiographic evidence of pulmonary hypertension, but RV seems decently supported  - Echo 2/6 then PRN  - Rhythm: complete heart block, currently VVI paced 140 bpm  - Diuresis: Bumex 0.5 mg PO q8 and Diuril 50mg po q8   - Continue aldactone bid - weight adjusted 1/9    FEN/GI:    - Monagen 26kcal/oz increase to 28 ml/hr over 22 hours (151 ml/kg/day - 132 kcal/kg/day). Vent NG q 4 hours. MCT oil 2 mL TID (12 kcal/kg/day)  - Changed to back to all monogen 1/22 - approved by insurance, working on finding a supplier   - D/C KCl  - Continue erythromycin for pro-motility  - Intermittent asymptomatic emesis    - Monitor electrolytes and replace as needed     - GI prophylaxis: esomeprazole while on steroids     Endo:  - Off steroids as of 2/2/22      Heme/ID:  - 4 mo vaccines 1/14/2022   - Vanc and Cefepime started 1/14/2022, switched to Cefdinir to treat Klebsiella, s/p #7/7  - Klebsiella noted on repeat resp culture on 1/27/22, but no significant WBCs    Access:  - Trach    Dispo: Working on sedation wean and home vent. No gastrostomy tube for now given position of  pacemaker and overall clinical status - likely plan for home NG feeds. Hypoxia worsening may be secondary to VQ mismatch or pulmonary overcirculation? with increasing Bosentan so holding for now with apparent improvement. Will watch echo closely for changes in RV. May need to start low dose 1-2mg/kg/dose BID.

## 2023-04-14 NOTE — ED ADULT TRIAGE NOTE - CHIEF COMPLAINT QUOTE
Pt w PMH HTN, GERD, emphysema reports syncope episode last night at 20:30. Pt reports she was having dinner w friends, got up the chair, fell dizzy and then remembers waking up on the floor. Pt reports having slurred speech after episode for about 20mins. pt denies any active symptoms at this time. pt reports " I think I had a TIA."

## 2023-04-14 NOTE — H&P ADULT - PROBLEM SELECTOR PLAN 4
continue HOME MEDS: Quetiapine 50mg PO daily and Fluoxetine 40mg PO daily.       N: DASH   E: Maintain K>4.0 and Mg>2.0  VTE PPx: Lovenox subcut  Code: Full continue HOME MEDS: Quetiapine 50mg PO daily and Fluoxetine 40mg PO daily.

## 2023-04-14 NOTE — H&P ADULT - NSICDXPASTSURGICALHX_GEN_ALL_CORE_FT
PAST SURGICAL HISTORY:  History of left knee replacement     History of Nissen fundoplication      PAST SURGICAL HISTORY:  History of left knee replacement     History of Nissen fundoplication     Perforated sigmoid colon

## 2023-04-14 NOTE — H&P ADULT - PROBLEM SELECTOR PLAN 1
currently asymptomatic, EKG SR without acute changes, CT head unremarkable.  --CE negative x 1 set, will F/U second set at 10PM.   --F/U orthostatics.  --obtain TTE in AM. currently asymptomatic, EKG SR without acute changes, CT head pending.  --CE negative x 1 set, will F/U second set at 10PM.   --F/U orthostatics.  --obtain TTE in AM.

## 2023-04-14 NOTE — H&P ADULT - ASSESSMENT
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

## 2023-04-14 NOTE — CONSULT NOTE ADULT - SUBJECTIVE AND OBJECTIVE BOX
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 ED 4/14/23 s/p witnessed syncope, EKG nonischemic, CE negative x 1 set, presented due to complaints of syncope. 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 Patient also complained of left shoulder pain her Pain management Doctor, Dr Downey follows with her and is a member of [employer] and patient requested f/u for shoulder injection            Review of Systems:  Other Review of Systems: All other review of systems negative, except as noted in HPI      Allergies and Intolerances:        Allergies:  	Compazine: Drug, Unknown  	ACE inhibitors: Drug Category, Unknown    Home Medications:   * Patient Currently Takes Medications as of 14-Apr-2023 21:06 documented in Structured Notes  · 	hydroCHLOROthiazide 25 mg oral tablet: Last Dose Taken:  , 1 tab(s) orally once a day  · 	zolpidem 12.5 mg oral tablet, extended release: Last Dose Taken:  , 1 tab(s) orally once a day (at bedtime)  · 	QUEtiapine 50 mg oral tablet: Last Dose Taken:  , 1 tab(s) orally once a day  · 	Crestor 5 mg oral tablet: Last Dose Taken:  , 1 tab(s) orally once a day  · 	Toprol-XL 25 mg oral tablet, extended release: Last Dose Taken:  , 1 tab(s) orally once a day  · 	Linzess 145 mcg oral capsule: Last Dose Taken:  , 1 cap(s) orally once a day  · 	FLUoxetine 40 mg oral capsule: Last Dose Taken:  , 1 cap(s) orally once a day    .    Patient History:    Past Medical, Past Surgical, and Family History:  PAST MEDICAL HISTORY:  Depression     GERD (gastroesophageal reflux disease)     HTN (hypertension)     IBS (irritable bowel syndrome).     PAST SURGICAL HISTORY:  History of left knee replacement     History of Nissen fundoplication     Perforated sigmoid colon.     Social History:  · Substance use	No     Tobacco Screening:  · Core Measure Site	No    Risk Assessment:    Present on Admission:  Deep Venous Thrombosis	no  Pulmonary Embolus	no     HIV Screening:  · In accordance with [place] State law, we offer every patient who comes to our ED an HIV test. Would you like to be tested today?	Opt out    Physical Exam:    Height/Weight/BSA/BMI:  · Height (CENTIMETERS)	167.6 Centimeter(s)  · Dosing Weight (KILOGRAMS)	90.7 kg  · Dosing Weight  (POUNDS)	199.9 Pound(s)  · BSA (m2)	2 Meter Squared  · BMI (kG/m2)	32.3     T/HR/RR/BP:  · Temp (F)	98.2 Degrees F  · Temp (C)	36.8 Degrees C  · Heart Rate	63 /min  · Respiration Rate (breaths/min)	18 /min  · BP Systolic	126 mm Hg  · BP Diastolic	77 mm Hg  · Blood Pressure - Method	electronic  · BP Noninvasive Mean	93 mm Hg  · SpO2 (%)	98 %  · O2 Delivery/Oxygen Delivery Method	room air     Physical Exam:  · Constitutional	well-groomed; no distress  · Eyes	PERRL; EOMI; conjunctiva clear  · Respiratory	clear to auscultation bilaterally; no wheezes; no rales; no rhonchi  · Cardiovascular	regular rate and rhythm; S1 S2 present  · Gastrointestinal	soft; nontender; nondistended; normal active bowel sounds  · Neurological	cranial nerves II-XII intact; sensation intact  · Skin	warm and dry; color normal; no rashes; no ulcers  · Musculoskeletal	normal; normal gait; ROM intact; strength 5/5 bilateral upper extremities; strength 5/5 bilateral lower extremities  · Psychiatric	normal affect; alert and oriented x3; normal behavior       Labs and Results:  Labs, Radiology, Cardiology, and Other Results: EKG SR without acute changes    Assessment and Plan:   · Completed VTE Risk Assessment(s)	Medical Assessment Completed on: 14-Apr-2023 21:19  · Completed VTE Risk Assessment(s)	Refer to the Assessment tab to view/cancel completed assessment.

## 2023-04-14 NOTE — ED ADULT NURSE NOTE - NS ED NURSE LEVEL OF CONSCIOUSNESS ORIENTATION
Pt observed pacing unit, keeps to self. Pt does not interact with peers, mostly observed to spend time in room. Pt is medication compliant. Pt stated during medication pass, \"I am going home on Monday I am really happy, I asked to leave at noon so I can go get some ice cream down the block, I am going home to my own apartment now\". Writer engaged with pt, active listening and encouraged pt to continue to focus on self. Staff to continue to monitor for safety.       Problem: Adult Mental Health  Goal: Reports or exhibits reduction in symptoms associated with elevated or labile mood/sera  Outcome: Outcome Not Met, Continue to Monitor  Goal: Reports or exhibits improvement in depressive mood signs/symptoms  Outcome: Outcome Not Met, Continue to Monitor  Goal: Demonstrates the ability to engage in reality-based communication and refrains from acting on delusional thoughts  Outcome: Outcome Not Met, Continue to Monitor  Goal: Verbalizes understanding of positive individual coping skills  Outcome: Outcome Not Met, Continue to Monitor  Goal: Demonstrates control of behavior, refraining from hostility, aggression or threats of violence  Outcome: Outcome Not Met, Continue to Monitor      Oriented - self; Oriented - place; Oriented - time

## 2023-04-14 NOTE — H&P ADULT - HISTORY OF PRESENT ILLNESS
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 Patient reports she was having dinner with a group of nurse friends during which she only had 1 drink and then passed out as she went to the bathroom. Patient reports she has never passed out before, per her friends she had LOC for <2 minutes however when she regained consciouness she briefly had slurred speech for ~20 minutes. Patient denies any preceding N/V, diaphoresis, palpitations, chest pain, PND, orthopnea, LE edema, recent travel, sick contacts or prior cardiac work-up.    In ED, BP: 135/86, HR: 60s, RR:18, Temp: 98.7F, O2 sat: 98% RA. EKG SR without acute changes. Chest XR unremarkable. Labs notable for: CE negative x 1 set.      Patient currently stable, admitted to cardiac telemetry for serial cardiac enzymes and further work-up to R/O cardiac origin of syncope.   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 Patient reports she was having dinner with a group of nurse friends during which she only had 1 drink and then passed out as she walked to the bathroom. Patient is uncertain if she had head trauma,  per witnesses patient had LOC for <2 minutes however when she regained consciousness she briefly had slurred speech for ~20 minutes. Patient denies any preceding N/V, diaphoresis, palpitations, chest pain, PND, orthopnea, LE edema, recent travel or sick contacts. Patient reportedly had a normal TTE and stress test ~ 1 yr ago.    In ED, BP: 135/86, HR: 60s, RR:18, Temp: 98.7F, O2 sat: 98% RA. EKG SR without acute changes. Chest XR unremarkable. Labs notable for: CE negative x 1 set.    Patient currently stable, admitted to cardiac telemetry for serial cardiac enzymes and further work-up to R/O cardiac origin of syncope.   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 Patient reports she was having dinner with a group of nurse friends during which she only had 1 drink and then passed out as she walked to the bathroom. Patient is uncertain if she had head trauma,  per witnesses patient had LOC for <2 minutes however when she regained consciousness she briefly had slurred speech for ~20 minutes. Patient denies any preceding N/V, diaphoresis, palpitations, chest pain, PND, orthopnea, LE edema, recent travel or sick contacts. Patient reportedly had a normal TTE and stress test ~ 1 yr ago.    In ED, BP: 135/86, HR: 60s, RR:18, Temp: 98.7F, O2 sat: 98% RA. EKG SR without acute changes. Chest XR unremarkable. Labs notable for: CE negative x 1 set.    Patient currently stable, admitted to cardiac telemetry for serial cardiac enzymes and further work-up to R/O cardiac origin of syncope.

## 2023-04-14 NOTE — CONSULT NOTE ADULT - SUBJECTIVE AND OBJECTIVE BOX
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 Patient also complained of left shoulder pain her Pain management Doctor, Dr Downey follows with her and is a member of [employer] and patient requested f/u for shoulder injection            Review of Systems:  Other Review of Systems: All other review of systems negative, except as noted in HPI      Allergies and Intolerances:        Allergies:  	Compazine: Drug, Unknown  	ACE inhibitors: Drug Category, Unknown    Home Medications:   * Patient Currently Takes Medications as of 14-Apr-2023 21:06 documented in Structured Notes  · 	hydroCHLOROthiazide 25 mg oral tablet: Last Dose Taken:  , 1 tab(s) orally once a day  · 	zolpidem 12.5 mg oral tablet, extended release: Last Dose Taken:  , 1 tab(s) orally once a day (at bedtime)  · 	QUEtiapine 50 mg oral tablet: Last Dose Taken:  , 1 tab(s) orally once a day  · 	Crestor 5 mg oral tablet: Last Dose Taken:  , 1 tab(s) orally once a day  · 	Toprol-XL 25 mg oral tablet, extended release: Last Dose Taken:  , 1 tab(s) orally once a day  · 	Linzess 145 mcg oral capsule: Last Dose Taken:  , 1 cap(s) orally once a day  · 	FLUoxetine 40 mg oral capsule: Last Dose Taken:  , 1 cap(s) orally once a day    .    Patient History:    Past Medical, Past Surgical, and Family History:  PAST MEDICAL HISTORY:  Depression     GERD (gastroesophageal reflux disease)     HTN (hypertension)     IBS (irritable bowel syndrome).     PAST SURGICAL HISTORY:  History of left knee replacement     History of Nissen fundoplication     Perforated sigmoid colon.     Social History:  · Substance use	No     Tobacco Screening:  · Core Measure Site	No    Risk Assessment:    Present on Admission:  Deep Venous Thrombosis	no  Pulmonary Embolus	no     HIV Screening:  · In accordance with [place] State law, we offer every patient who comes to our ED an HIV test. Would you like to be tested today?	Opt out    Physical Exam:    Height/Weight/BSA/BMI:  · Height (CENTIMETERS)	167.6 Centimeter(s)  · Dosing Weight (KILOGRAMS)	90.7 kg  · Dosing Weight  (POUNDS)	199.9 Pound(s)  · BSA (m2)	2 Meter Squared  · BMI (kG/m2)	32.3     T/HR/RR/BP:  · Temp (F)	98.2 Degrees F  · Temp (C)	36.8 Degrees C  · Heart Rate	63 /min  · Respiration Rate (breaths/min)	18 /min  · BP Systolic	126 mm Hg  · BP Diastolic	77 mm Hg  · Blood Pressure - Method	electronic  · BP Noninvasive Mean	93 mm Hg  · SpO2 (%)	98 %  · O2 Delivery/Oxygen Delivery Method	room air     Physical Exam:  · Constitutional	well-groomed; no distress  · Eyes	PERRL; EOMI; conjunctiva clear  · Respiratory	clear to auscultation bilaterally; no wheezes; no rales; no rhonchi  · Cardiovascular	regular rate and rhythm; S1 S2 present  · Gastrointestinal	soft; nontender; nondistended; normal active bowel sounds  · Neurological	cranial nerves II-XII intact; sensation intact  · Skin	warm and dry; color normal; no rashes; no ulcers  · Musculoskeletal	normal; normal gait; ROM intact; strength 5/5 bilateral upper extremities; strength 5/5 bilateral lower extremities  · Psychiatric	normal affect; alert and oriented x3; normal behavior       Labs and Results:  Labs, Radiology, Cardiology, and Other Results: EKG SR without acute changes    Assessment and Plan:   · Completed VTE Risk Assessment(s)	Medical Assessment Completed on: 14-Apr-2023 21:19  · Completed VTE Risk Assessment(s)	Refer to the Assessment tab to view/cancel completed assessment.

## 2023-04-14 NOTE — CONSULT NOTE ADULT - ASSESSMENT
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   , EKG SR without acute changes, CT head pending.  trops negative x 3   monitor orthostatics.  f/u TTE      Hypertension.   · continue HOME MEDS: Toprol XL 25mg PO daily and HCTZ 25mg PO daily.      Hyperlipidemia.   · while inpatient,  Atorvastatin 20mg PO qhs.  --F/U Lipid panel.       : Major depression.    continue HOME MEDS: Quetiapine 50mg PO daily and Fluoxetine 40mg PO daily.     5:  ·  Problem: Bilateral shoulder pain.   ·  Plan: followed by pain management Dr. Dwoney  --received IV injections in bilateral shoulders in the past, currently only taking Tylenol and Aleve PRN at home without relief.  PLEASE CONSULT PAIN MANAGEMENT WHILE PATIENT IS IN HOUSE, Patient requesting possibility of shoulder injection while inpatient

## 2023-04-14 NOTE — H&P ADULT - NSICDXPASTMEDICALHX_GEN_ALL_CORE_FT
PAST MEDICAL HISTORY:  Depression     GERD (gastroesophageal reflux disease)     HTN (hypertension)      PAST MEDICAL HISTORY:  Depression     GERD (gastroesophageal reflux disease)     HTN (hypertension)     IBS (irritable bowel syndrome)

## 2023-04-14 NOTE — ED PROVIDER NOTE - CLINICAL SUMMARY MEDICAL DECISION MAKING FREE TEXT BOX
Pt is a 71 y/o female with a PMHx of HTN, emphysema (recently discovered), GERD, chronic  abdominal pain, chronic shoulder pain, and a PSHx of left knee replacement who reports to the ED after she experienced 1x episode of syncope last night. Will admit for syncope, b/l shoulder XRAY and CXR and give Tylenol.

## 2023-04-14 NOTE — ED PROVIDER NOTE - NSICDXPASTSURGICALHX_GEN_ALL_CORE_FT
PAST SURGICAL HISTORY:  History of Nissen fundoplication       History of left knee replacement

## 2023-04-14 NOTE — ED ADULT TRIAGE NOTE - RESPIRATORY RATE (BREATHS/MIN)
Call from patient reporting allergic reaction. Patient reports it usually has happened in the fall. However, patient reports not as severe as symptoms are now.     Patient reports symptoms to include: hives, shaking and anxiety. Denies shortness of breath.     Patient has been advised to go to the ED / UC now to be evaluated. Patient verbalized understanding.    18

## 2023-04-14 NOTE — H&P ADULT - PROBLEM SELECTOR PLAN 5
followed by pain management Dr. Downey  --received IV injections in bilateral shoulders in the past, currently only taking Tylenol and Aleve PRN at home without relief.        N: DASH   E: Maintain K>4.0 and Mg>2.0  VTE PPx: Lovenox subcut  Code: Full

## 2023-04-14 NOTE — ED PROVIDER NOTE - NSICDXPASTMEDICALHX_GEN_ALL_CORE_FT
PAST MEDICAL HISTORY:  Depression     GERD (gastroesophageal reflux disease)     HTN (hypertension)

## 2023-04-14 NOTE — ED PROVIDER NOTE - OBJECTIVE STATEMENT
Pt is a 71 y/o female with a PMHx of HTN, emphysema (recently discovered), GERD, chronic  abdominal pain, chronic shoulder pain, and a PSHx of left knee replacement who reports to the ED after she experienced 1x episode of syncope last night. 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 Pt claims she has never passed out before and was feeling "woozy" and that was she was only out for 2-3 minutes and that the nurses that she was with reported that she had slurred speech after she came around. Pt is also c/o of b/l chronic shoulder pain that is extremely severe in her right shoulder. Pt denies chest pain, abdominal swelling or abdominal pain. Pt recently returned from a trip from [place] last week. Pt is a 73 y/o female with a PMHx of HTN, emphysema (recently discovered), GERD, chronic  abdominal pain, chronic shoulder pain, and a PSHx of left knee replacement who reports to the ED after she experienced 1x episode of syncope last night. 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 Pt claims she has never passed out before and was feeling "woozy" after and that was she was only out for 2-3 minutes and that the nurses that she was with reported that she had slurred speech after she came around. Pt is also c/o of b/l chronic shoulder pain that is extremely severe in her right shoulder. Pt denies chest pain, abdominal swelling or abdominal pain.

## 2023-04-14 NOTE — CONSULT NOTE ADULT - ASSESSMENT
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   , EKG SR without acute changes, CT head pending.  trops negative x 3   monitor orthostatics.  f/u TTE      Hypertension.   · continue HOME MEDS: Toprol XL 25mg PO daily and HCTZ 25mg PO daily.      Hyperlipidemia.   · while inpatient,  Atorvastatin 20mg PO qhs.  --F/U Lipid panel.       : Major depression.    continue HOME MEDS: Quetiapine 50mg PO daily and Fluoxetine 40mg PO daily.     5:  ·  Problem: Bilateral shoulder pain.   ·  Plan: followed by pain management Dr. Downey  --received IV injections in bilateral shoulders in the past, currently only taking Tylenol and Aleve PRN at home without relief.  PLEASE CONSULT PAIN MANAGEMENT WHILE PATIENT IS IN HOUSE, Patient requesting possibility of shoulder injection while inpatient

## 2023-04-15 LAB
ANION GAP SERPL CALC-SCNC: 9 MMOL/L — SIGNIFICANT CHANGE UP (ref 5–17)
BUN SERPL-MCNC: 11 MG/DL — SIGNIFICANT CHANGE UP (ref 7–23)
CALCIUM SERPL-MCNC: 8.6 MG/DL — SIGNIFICANT CHANGE UP (ref 8.4–10.5)
CHLORIDE SERPL-SCNC: 104 MMOL/L — SIGNIFICANT CHANGE UP (ref 96–108)
CHOLEST SERPL-MCNC: 141 MG/DL — SIGNIFICANT CHANGE UP
CK MB CFR SERPL CALC: <1 NG/ML — SIGNIFICANT CHANGE UP (ref 0–6.7)
CK MB CFR SERPL CALC: <1 NG/ML — SIGNIFICANT CHANGE UP (ref 0–6.7)
CK SERPL-CCNC: 56 U/L — SIGNIFICANT CHANGE UP (ref 25–170)
CK SERPL-CCNC: 66 U/L — SIGNIFICANT CHANGE UP (ref 25–170)
CO2 SERPL-SCNC: 25 MMOL/L — SIGNIFICANT CHANGE UP (ref 22–31)
CREAT SERPL-MCNC: 1.08 MG/DL — SIGNIFICANT CHANGE UP (ref 0.5–1.3)
EGFR: 55 ML/MIN/1.73M2 — LOW
GLUCOSE SERPL-MCNC: 92 MG/DL — SIGNIFICANT CHANGE UP (ref 70–99)
HCT VFR BLD CALC: 37.7 % — SIGNIFICANT CHANGE UP (ref 34.5–45)
HCV AB S/CO SERPL IA: 0.09 S/CO — SIGNIFICANT CHANGE UP (ref 0–0.99)
HCV AB SERPL-IMP: SIGNIFICANT CHANGE UP
HDLC SERPL-MCNC: 51 MG/DL — SIGNIFICANT CHANGE UP
HGB BLD-MCNC: 12 G/DL — SIGNIFICANT CHANGE UP (ref 11.5–15.5)
LIPID PNL WITH DIRECT LDL SERPL: 70 MG/DL — SIGNIFICANT CHANGE UP
MAGNESIUM SERPL-MCNC: 1.8 MG/DL — SIGNIFICANT CHANGE UP (ref 1.6–2.6)
MCHC RBC-ENTMCNC: 27.7 PG — SIGNIFICANT CHANGE UP (ref 27–34)
MCHC RBC-ENTMCNC: 31.8 GM/DL — LOW (ref 32–36)
MCV RBC AUTO: 87.1 FL — SIGNIFICANT CHANGE UP (ref 80–100)
NON HDL CHOLESTEROL: 90 MG/DL — SIGNIFICANT CHANGE UP
NRBC # BLD: 0 /100 WBCS — SIGNIFICANT CHANGE UP (ref 0–0)
PLATELET # BLD AUTO: 157 K/UL — SIGNIFICANT CHANGE UP (ref 150–400)
POTASSIUM SERPL-MCNC: 3.8 MMOL/L — SIGNIFICANT CHANGE UP (ref 3.5–5.3)
POTASSIUM SERPL-SCNC: 3.8 MMOL/L — SIGNIFICANT CHANGE UP (ref 3.5–5.3)
RBC # BLD: 4.33 M/UL — SIGNIFICANT CHANGE UP (ref 3.8–5.2)
RBC # FLD: 15.7 % — HIGH (ref 10.3–14.5)
SODIUM SERPL-SCNC: 138 MMOL/L — SIGNIFICANT CHANGE UP (ref 135–145)
T4 AB SER-ACNC: 8.06 UG/DL — SIGNIFICANT CHANGE UP (ref 4.5–11.7)
TRIGL SERPL-MCNC: 102 MG/DL — SIGNIFICANT CHANGE UP
TROPONIN T SERPL-MCNC: 0.01 NG/ML — SIGNIFICANT CHANGE UP (ref 0–0.01)
TSH SERPL-MCNC: 3.12 UIU/ML — SIGNIFICANT CHANGE UP (ref 0.27–4.2)
WBC # BLD: 6.35 K/UL — SIGNIFICANT CHANGE UP (ref 3.8–10.5)
WBC # FLD AUTO: 6.35 K/UL — SIGNIFICANT CHANGE UP (ref 3.8–10.5)

## 2023-04-15 PROCEDURE — 70450 CT HEAD/BRAIN W/O DYE: CPT | Mod: 26

## 2023-04-15 PROCEDURE — 99233 SBSQ HOSP IP/OBS HIGH 50: CPT

## 2023-04-15 PROCEDURE — 93306 TTE W/DOPPLER COMPLETE: CPT | Mod: 26

## 2023-04-15 RX ORDER — MAGNESIUM SULFATE 500 MG/ML
2 VIAL (ML) INJECTION ONCE
Refills: 0 | Status: COMPLETED | OUTPATIENT
Start: 2023-04-15 | End: 2023-04-15

## 2023-04-15 RX ORDER — LIDOCAINE 4 G/100G
1 CREAM TOPICAL EVERY 24 HOURS
Refills: 0 | Status: DISCONTINUED | OUTPATIENT
Start: 2023-04-15 | End: 2023-04-16

## 2023-04-15 RX ORDER — ENOXAPARIN SODIUM 100 MG/ML
40 INJECTION SUBCUTANEOUS EVERY 24 HOURS
Refills: 0 | Status: DISCONTINUED | OUTPATIENT
Start: 2023-04-15 | End: 2023-04-16

## 2023-04-15 RX ORDER — POTASSIUM CHLORIDE 20 MEQ
40 PACKET (EA) ORAL ONCE
Refills: 0 | Status: COMPLETED | OUTPATIENT
Start: 2023-04-15 | End: 2023-04-15

## 2023-04-15 RX ADMIN — ATORVASTATIN CALCIUM 20 MILLIGRAM(S): 80 TABLET, FILM COATED ORAL at 21:38

## 2023-04-15 RX ADMIN — Medication 25 GRAM(S): at 07:55

## 2023-04-15 RX ADMIN — LIDOCAINE 1 PATCH: 4 CREAM TOPICAL at 18:22

## 2023-04-15 RX ADMIN — Medication 40 MILLIEQUIVALENT(S): at 07:55

## 2023-04-15 RX ADMIN — ZOLPIDEM TARTRATE 5 MILLIGRAM(S): 10 TABLET ORAL at 01:42

## 2023-04-15 RX ADMIN — ENOXAPARIN SODIUM 40 MILLIGRAM(S): 100 INJECTION SUBCUTANEOUS at 15:02

## 2023-04-15 RX ADMIN — Medication 25 MILLIGRAM(S): at 06:05

## 2023-04-15 RX ADMIN — Medication 40 MILLIGRAM(S): at 14:20

## 2023-04-15 RX ADMIN — LIDOCAINE 1 PATCH: 4 CREAM TOPICAL at 15:43

## 2023-04-15 RX ADMIN — LIDOCAINE 1 PATCH: 4 CREAM TOPICAL at 21:39

## 2023-04-15 RX ADMIN — ZOLPIDEM TARTRATE 5 MILLIGRAM(S): 10 TABLET ORAL at 21:39

## 2023-04-15 RX ADMIN — LIDOCAINE 1 PATCH: 4 CREAM TOPICAL at 09:03

## 2023-04-15 RX ADMIN — ZOLPIDEM TARTRATE 5 MILLIGRAM(S): 10 TABLET ORAL at 23:24

## 2023-04-15 NOTE — PROGRESS NOTE ADULT - SUBJECTIVE AND OBJECTIVE BOX
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 Patient also complained of left shoulder pain her Pain management Doctor, Dr Downey follows with her and is a member of [employer] and patient requested f/u for shoulder injection            Review of Systems:  Other Review of Systems: All other review of systems negative, except as noted in HPI      Allergies and Intolerances:        Allergies:  	Compazine: Drug, Unknown  	ACE inhibitors: Drug Category, Unknown    Home Medications:   * Patient Currently Takes Medications as of 14-Apr-2023 21:06 documented in Structured Notes  · 	hydroCHLOROthiazide 25 mg oral tablet: Last Dose Taken:  , 1 tab(s) orally once a day  · 	zolpidem 12.5 mg oral tablet, extended release: Last Dose Taken:  , 1 tab(s) orally once a day (at bedtime)  · 	QUEtiapine 50 mg oral tablet: Last Dose Taken:  , 1 tab(s) orally once a day  · 	Crestor 5 mg oral tablet: Last Dose Taken:  , 1 tab(s) orally once a day  · 	Toprol-XL 25 mg oral tablet, extended release: Last Dose Taken:  , 1 tab(s) orally once a day  · 	Linzess 145 mcg oral capsule: Last Dose Taken:  , 1 cap(s) orally once a day  · 	FLUoxetine 40 mg oral capsule: Last Dose Taken:  , 1 cap(s) orally once a day    .    Patient History:    Past Medical, Past Surgical, and Family History:  PAST MEDICAL HISTORY:  Depression     GERD (gastroesophageal reflux disease)     HTN (hypertension)     IBS (irritable bowel syndrome).     PAST SURGICAL HISTORY:  History of left knee replacement     History of Nissen fundoplication     Perforated sigmoid colon.     Social History:  · Substance use	No     Tobacco Screening:  · Core Measure Site	No    Risk Assessment:    Present on Admission:  Deep Venous Thrombosis	no  Pulmonary Embolus	no     HIV Screening:  · In accordance with [place] State law, we offer every patient who comes to our ED an HIV test. Would you like to be tested today?	Opt out    Vital Signs Last 24 Hrs  T(C): 36.4 (15 Apr 2023 10:04), Max: 37.1 (14 Apr 2023 15:21)  T(F): 97.6 (15 Apr 2023 10:04), Max: 98.7 (14 Apr 2023 15:21)  HR: 64 (15 Apr 2023 06:03) (63 - 72)  BP: 119/76 (15 Apr 2023 06:03) (119/76 - 155/79)  BP(mean): 93 (15 Apr 2023 06:03) (93 - 110)  RR: 18 (15 Apr 2023 06:03) (18 - 18)  SpO2: 95% (15 Apr 2023 06:03) (95% - 98%)    Parameters below as of 15 Apr 2023 06:03  Patient On (Oxygen Delivery Method): room air         Physical Exam:  · Constitutional	well-groomed; no distress  · Eyes	PERRL; EOMI; conjunctiva clear  · Respiratory	clear to auscultation bilaterally; no wheezes; no rales; no rhonchi  · Cardiovascular	regular rate and rhythm; S1 S2 present  · Gastrointestinal	soft; nontender; nondistended; normal active bowel sounds  · Neurological	cranial nerves II-XII intact; sensation intact  · Skin	warm and dry; color normal; no rashes; no ulcers  · Musculoskeletal	normal; normal gait; ROM intact; strength 5/5 bilateral upper extremities; strength 5/5 bilateral lower extremities  · Psychiatric	normal affect; alert and oriented x3; normal behavior       Labs and Results:  Labs, Radiology, Cardiology, and Other Results: EKG SR without acute changes    Assessment and Plan:   · Completed VTE Risk Assessment(s)	Medical Assessment Completed on: 14-Apr-2023 21:19  · Completed VTE Risk Assessment(s)	Refer to the Assessment tab to view/cancel completed assessment.

## 2023-04-15 NOTE — PROGRESS NOTE ADULT - SUBJECTIVE AND OBJECTIVE BOX
OVERNIGHT EVENTS: NAEO    SUBJECTIVE / INTERVAL HPI: Patient seen and examined at bedside. Patient denying chest pain, SOB, palpitations, cough. Patient denies fever, chills, HA, Dizziness, N/V, abdominal pain, diarrhea, constipation, hematochezia/melena, dysuria, hematuria, new onset weakness/numbness, LE pain and/or swelling. Only active complaint is left shoulder pain which is chronic, no acute injuries.     Remaining ROS negative       PHYSICAL EXAM:    General:NAD.   HEENT: NC/AT; PERRL, anicteric sclera; MMM  Neck: supple  Cardiovascular: +S1/S2, RRR  Respiratory: CTA B/L; no W/R/R  Gastrointestinal: soft, NT/ND; +BSx4  Extremities: WWP; no edema, clubbing or cyanosis  Vascular: 2+ radial, DP/PT pulses B/L  Neurological: AAOx3; no focal deficits, 5/5 strength   Psychiatric: pleasant mood and affect  Dermatologic: no appreciable wounds or damage to the skin    VITAL SIGNS:  Vital Signs Last 24 Hrs  T(C): 36.4 (15 Apr 2023 10:04), Max: 37.1 (14 Apr 2023 15:21)  T(F): 97.6 (15 Apr 2023 10:04), Max: 98.7 (14 Apr 2023 15:21)  HR: 64 (15 Apr 2023 06:03) (63 - 72)  BP: 119/76 (15 Apr 2023 06:03) (119/76 - 155/79)  BP(mean): 93 (15 Apr 2023 06:03) (93 - 110)  RR: 18 (15 Apr 2023 06:03) (18 - 18)  SpO2: 95% (15 Apr 2023 06:03) (95% - 98%)    Parameters below as of 15 Apr 2023 06:03  Patient On (Oxygen Delivery Method): room air          MEDICATIONS:  MEDICATIONS  (STANDING):  atorvastatin 20 milliGRAM(s) Oral at bedtime  enoxaparin Injectable 40 milliGRAM(s) SubCutaneous every 24 hours  FLUoxetine 40 milliGRAM(s) Oral daily  hydrochlorothiazide 25 milliGRAM(s) Oral daily  lidocaine   4% Patch 1 Patch Transdermal every 24 hours  metoprolol succinate ER 25 milliGRAM(s) Oral daily  QUEtiapine 50 milliGRAM(s) Oral at bedtime    MEDICATIONS  (PRN):  zolpidem 5 milliGRAM(s) Oral at bedtime PRN Insomnia  zolpidem 5 milliGRAM(s) Oral at bedtime PRN Insomnia      ALLERGIES:  Allergies    ACE inhibitors (Unknown)  Compazine (Unknown)    Intolerances        LABS:                        12.0   6.35  )-----------( 157      ( 15 Apr 2023 05:30 )             37.7     04-15    138  |  104  |  11  ----------------------------<  92  3.8   |  25  |  1.08    Ca    8.6      15 Apr 2023 05:30  Mg     1.8     04-15      PT/INR - ( 14 Apr 2023 15:57 )   PT: 12.9 sec;   INR: 1.08          PTT - ( 14 Apr 2023 15:57 )  PTT:32.7 sec    CAPILLARY BLOOD GLUCOSE          RADIOLOGY & ADDITIONAL TESTS: Reviewed.

## 2023-04-15 NOTE — PROGRESS NOTE ADULT - ATTENDING COMMENTS
Patient seen and examined on 4/15/23. Case discussed with resident.     s/p episode of syncope during dinner - while walking to bathroom  follows with outpt cardiology - has appt on 4/18 with Dr. Rousseau  no chest pain/shortness of breath  complaining of foggy memory and reported slurred speech after event  here trops neg  EKG: NSR, NSTWa v1-v2  -tele monitoring  -TTE  -neuro c/s  -continue home meds  -outpt follow-up for extended ambulatory monitor recommended - pt favors doing this with her cardiologist    Laura Gonzalez MD

## 2023-04-15 NOTE — PROGRESS NOTE ADULT - PROBLEM SELECTOR PLAN 1
Currently asymptomatic, EKG SR without acute changes, CT head non con unremarkable. Orthostatics negative. Patient feels well and is back to baseline. Cardiac enzymes negative x2.   - pending TTE.  - Consider neuro consult - for further work up of questionable slurred speech Currently asymptomatic, EKG SR without acute changes, CT head non con unremarkable. Orthostatics negative. Patient feels well and is back to baseline. Cardiac enzymes negative x2.   - pending TTE.  - Neuro consult - for further work up of questionable slurred speech per witnesses

## 2023-04-15 NOTE — PROGRESS NOTE ADULT - PROBLEM SELECTOR PLAN 5
followed by pain management Dr. Downey  --received IV injections in bilateral shoulders in the past, currently only taking Tylenol and Aleve PRN at home without relief.  - Lidocaine patch      N: DASH   E: Maintain K>4.0 and Mg>2.0  VTE PPx: Lovenox subcut  Code: Full

## 2023-04-15 NOTE — CONSULT NOTE ADULT - SUBJECTIVE AND OBJECTIVE BOX
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 Patient reports she was having dinner with a group of nurse friends during which she only had 1 drink and then passed out as she walked to the bathroom. Patient is uncertain if she had head trauma,  per witnesses patient had LOC for <2 minutes however when she regained consciousness she briefly had slurred speech for ~20 minutes. Patient denies any preceding N/V, diaphoresis, palpitations, chest pain, PND, orthopnea, LE edema, recent travel or sick contacts. Patient reportedly had a normal TTE and stress test ~ 1 yr ago.  In ED, BP: 135/86, HR: 60s, RR:18, Temp: 98.7F, O2 sat: 98% RA. EKG SR without acute changes. Chest XR unremarkable. Labs notable for: CE negative x 1 set.  Patient currently stable, admitted to cardiac telemetry for serial cardiac enzymes and further work-up to R/O cardiac origin of syncope.    Neurology was consulted for episode of slurred speech just before and after the syncopal event lasting about 20-30 min. Per patient she had sitting with her friends at the restaurant and having a fun time. She states she had only one drink (cocktail). At some point she felt to urge going to the bathroom, she stood up and while waking en route the she passed out, she cannot recollect any prodromal symptoms for that. She woke up with her friends around and they stating that her speech was slurry for about 20-30 min. No incontinence, no hitting her head, no seizure-like activity or stiffness noted. Also she states that she had been not sleeping good lately and was off from her  usual Ambien which she was taking for years. Instead of that her PCP prescribed Seroquel 25 mg. which she took very short period of time. Other than that s he denies regular drinking, drugs, smoking. Takes Prozac 40 mg. 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 Off note she had MRI of IAC for hearing issues on one side (stating on L).         MEDICATIONS  Home Medications:  Crestor 5 mg oral tablet: 1 tab(s) orally once a day (14 Apr 2023 21:03)  FLUoxetine 40 mg oral capsule: 1 cap(s) orally once a day (14 Apr 2023 21:05)  hydroCHLOROthiazide 25 mg oral tablet: 1 tab(s) orally once a day (14 Apr 2023 21:06)  Linzess 145 mcg oral capsule: 1 cap(s) orally once a day (14 Apr 2023 21:04)  QUEtiapine 50 mg oral tablet: 1 tab(s) orally once a day (14 Apr 2023 21:03)  Toprol-XL 25 mg oral tablet, extended release: 1 tab(s) orally once a day (14 Apr 2023 21:04)  zolpidem 12.5 mg oral tablet, extended release: 1 tab(s) orally once a day (at bedtime) (14 Apr 2023 21:06)    MEDICATIONS  (STANDING):  atorvastatin 20 milliGRAM(s) Oral at bedtime  enoxaparin Injectable 40 milliGRAM(s) SubCutaneous every 24 hours  FLUoxetine 40 milliGRAM(s) Oral daily  hydrochlorothiazide 25 milliGRAM(s) Oral daily  lidocaine   4% Patch 1 Patch Transdermal every 24 hours  lidocaine   4% Patch 1 Patch Transdermal every 24 hours  metoprolol succinate ER 25 milliGRAM(s) Oral daily  QUEtiapine 50 milliGRAM(s) Oral at bedtime    MEDICATIONS  (PRN):  zolpidem 5 milliGRAM(s) Oral at bedtime PRN Insomnia  zolpidem 5 milliGRAM(s) Oral at bedtime PRN Insomnia      FAMILY HISTORY:    SOCIAL HISTORY: negative for tobacco, alcohol, or ilicit drug use.    Allergies    Compazine (Unknown)  ACE inhibitors (Unknown)    Intolerances        GEN: NAD, pleasant, cooperative    NEURO:   MENTAL STATUS: AAOx3  LANG/SPEECH: Fluent, intact naming, repetition & comprehension  CRANIAL NERVES:  II: Pupils equal and reactive, no RAPD, normal visual field on confrontation  III, IV, VI: EOM intact, no gaze preference or deviation  V: normal  VII: no gross facial assymmetry.   VIII: normal hearing to speech and rubbing fingers b/l, [place] with lateralization to the R, w BC>AC  MOTOR: 5/5 in both upper and lower extremities  REFLEXES: 2/4 throughout, bilateral flexor plantars  SENSORY: Normal to touch, temperature & pin prick in all extremiteis  COORD: Normal finger to nose and heel to shin, no tremor, no dysmetria  Gait: narrow based. Romberg negative.         LABS:                        12.0   6.35  )-----------( 157      ( 15 Apr 2023 05:30 )             37.7     04-15    138  |  104  |  11  ----------------------------<  92  3.8   |  25  |  1.08    Ca    8.6      15 Apr 2023 05:30  Mg     1.8     04-15      Hemoglobin A1C:   Vitamin B12   PT/INR - ( 14 Apr 2023 15:57 )   PT: 12.9 sec;   INR: 1.08          PTT - ( 14 Apr 2023 15:57 )  PTT:32.7 sec  CAPILLARY BLOOD GLUCOSE

## 2023-04-15 NOTE — CONSULT NOTE ADULT - ASSESSMENT
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 Per hx and physical exam, this episode was most likely due to orthostatic hypotension provoked by lack of sleep, dehydration, use of Etoh with Prozac, along with in Ambien withdrawal, and possible Quetiapine in the her system which affected her speech and balance at that time. Still it would be prudent r/o small stroke with MRI brain w/o contrast.     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 Per hx and physical exam, this episode was most likely due to orthostatic hypotension vs cardiogenic event provoked by lack of sleep, dehydration, use of Etoh with Prozac, along with in Ambien, and possible Quetiapine in the her system which affected her speech and balance at that time. Slurred speech lasted ~ 20 min. and confusion with amnesia cannot explain syncope and she needs further w/up, starting with MRI brain w/o contrast to r/o small stroke.     Recommendations:   - MRI brain w/o contrast  - please obtain CRP, ESR  - syncope w/u per primary team      The case was discussed with attending Dr. 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 Per history given by the patient, this episode may be due to orthostatic hypotension vs cardiogenic event provoked by lack of sleep, dehydration, use of Etoh with Prozac at that time. Slurred speech lasted ~ 20 min with confusion/amnesia at that moment doesn’t fit to just syncope and she needs further neurological w/up, starting with MRI brain w/o contrast to r/o small stroke.     Recommendations:   - MRI brain w/o contrast  - please obtain CRP, ESR  - syncope w/u per primary team      The case was discussed with attending Dr. Simms

## 2023-04-15 NOTE — PROGRESS NOTE ADULT - ASSESSMENT
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

## 2023-04-15 NOTE — PROGRESS NOTE ADULT - ASSESSMENT
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 ED 4/14/23 s/p witnessed syncope, EKG nonischemic, CE negative x 1 set evaluated for syncope and chronic shoulder pain in [facility]       Syncope.   , EKG SR without acute changes, CT head pending.  trops negative x 3   monitor orthostatics.  f/u TTE      Hypertension.   · continue HOME MEDS: Toprol XL 25mg PO daily and HCTZ 25mg PO daily.      Hyperlipidemia.   · while inpatient,  Atorvastatin 20mg PO qhs.  --F/U Lipid panel.       : Major depression.    continue HOME MEDS: Quetiapine 50mg PO daily and Fluoxetine 40mg PO daily.     5:  ·  Problem: Bilateral shoulder pain.   ·  Plan: followed by pain management Dr. Downey  --received IV injections in bilateral shoulders in the past, currently only taking Tylenol and Aleve PRN at home without relief.  PLEASE CONSULT PAIN MANAGEMENT WHILE PATIENT IS IN HOUSE, Patient requesting possibility of shoulder injection while inpatient

## 2023-04-16 ENCOUNTER — TRANSCRIPTION ENCOUNTER (OUTPATIENT)
Age: 73
End: 2023-04-16

## 2023-04-16 VITALS
RESPIRATION RATE: 18 BRPM | OXYGEN SATURATION: 99 % | HEART RATE: 84 BPM | SYSTOLIC BLOOD PRESSURE: 145 MMHG | DIASTOLIC BLOOD PRESSURE: 76 MMHG

## 2023-04-16 LAB
ANION GAP SERPL CALC-SCNC: 7 MMOL/L — SIGNIFICANT CHANGE UP (ref 5–17)
BUN SERPL-MCNC: 10 MG/DL — SIGNIFICANT CHANGE UP (ref 7–23)
CALCIUM SERPL-MCNC: 9.6 MG/DL — SIGNIFICANT CHANGE UP (ref 8.4–10.5)
CHLORIDE SERPL-SCNC: 102 MMOL/L — SIGNIFICANT CHANGE UP (ref 96–108)
CO2 SERPL-SCNC: 28 MMOL/L — SIGNIFICANT CHANGE UP (ref 22–31)
CREAT SERPL-MCNC: 1.05 MG/DL — SIGNIFICANT CHANGE UP (ref 0.5–1.3)
CRP SERPL-MCNC: 12.9 MG/L — HIGH (ref 0–4)
EGFR: 57 ML/MIN/1.73M2 — LOW
ERYTHROCYTE [SEDIMENTATION RATE] IN BLOOD: 27 MM/HR — HIGH
GLUCOSE SERPL-MCNC: 120 MG/DL — HIGH (ref 70–99)
HCT VFR BLD CALC: 41.3 % — SIGNIFICANT CHANGE UP (ref 34.5–45)
HGB BLD-MCNC: 13.1 G/DL — SIGNIFICANT CHANGE UP (ref 11.5–15.5)
MAGNESIUM SERPL-MCNC: 2 MG/DL — SIGNIFICANT CHANGE UP (ref 1.6–2.6)
MCHC RBC-ENTMCNC: 27.6 PG — SIGNIFICANT CHANGE UP (ref 27–34)
MCHC RBC-ENTMCNC: 31.7 GM/DL — LOW (ref 32–36)
MCV RBC AUTO: 87.1 FL — SIGNIFICANT CHANGE UP (ref 80–100)
NRBC # BLD: 0 /100 WBCS — SIGNIFICANT CHANGE UP (ref 0–0)
PHOSPHATE SERPL-MCNC: 3.4 MG/DL — SIGNIFICANT CHANGE UP (ref 2.5–4.5)
PLATELET # BLD AUTO: 157 K/UL — SIGNIFICANT CHANGE UP (ref 150–400)
POTASSIUM SERPL-MCNC: 4.4 MMOL/L — SIGNIFICANT CHANGE UP (ref 3.5–5.3)
POTASSIUM SERPL-SCNC: 4.4 MMOL/L — SIGNIFICANT CHANGE UP (ref 3.5–5.3)
RBC # BLD: 4.74 M/UL — SIGNIFICANT CHANGE UP (ref 3.8–5.2)
RBC # FLD: 15.7 % — HIGH (ref 10.3–14.5)
SODIUM SERPL-SCNC: 137 MMOL/L — SIGNIFICANT CHANGE UP (ref 135–145)
WBC # BLD: 7.55 K/UL — SIGNIFICANT CHANGE UP (ref 3.8–10.5)
WBC # FLD AUTO: 7.55 K/UL — SIGNIFICANT CHANGE UP (ref 3.8–10.5)

## 2023-04-16 PROCEDURE — 70450 CT HEAD/BRAIN W/O DYE: CPT

## 2023-04-16 PROCEDURE — 71046 X-RAY EXAM CHEST 2 VIEWS: CPT

## 2023-04-16 PROCEDURE — 84100 ASSAY OF PHOSPHORUS: CPT

## 2023-04-16 PROCEDURE — 84443 ASSAY THYROID STIM HORMONE: CPT

## 2023-04-16 PROCEDURE — 85610 PROTHROMBIN TIME: CPT

## 2023-04-16 PROCEDURE — 70551 MRI BRAIN STEM W/O DYE: CPT | Mod: 26

## 2023-04-16 PROCEDURE — 73030 X-RAY EXAM OF SHOULDER: CPT

## 2023-04-16 PROCEDURE — 82553 CREATINE MB FRACTION: CPT

## 2023-04-16 PROCEDURE — 99285 EMERGENCY DEPT VISIT HI MDM: CPT

## 2023-04-16 PROCEDURE — 82550 ASSAY OF CK (CPK): CPT

## 2023-04-16 PROCEDURE — 80048 BASIC METABOLIC PNL TOTAL CA: CPT

## 2023-04-16 PROCEDURE — 36415 COLL VENOUS BLD VENIPUNCTURE: CPT

## 2023-04-16 PROCEDURE — 85025 COMPLETE CBC W/AUTO DIFF WBC: CPT

## 2023-04-16 PROCEDURE — 99239 HOSP IP/OBS DSCHRG MGMT >30: CPT

## 2023-04-16 PROCEDURE — G0378: CPT

## 2023-04-16 PROCEDURE — 83735 ASSAY OF MAGNESIUM: CPT

## 2023-04-16 PROCEDURE — 87635 SARS-COV-2 COVID-19 AMP PRB: CPT

## 2023-04-16 PROCEDURE — 84484 ASSAY OF TROPONIN QUANT: CPT

## 2023-04-16 PROCEDURE — 86803 HEPATITIS C AB TEST: CPT

## 2023-04-16 PROCEDURE — 85730 THROMBOPLASTIN TIME PARTIAL: CPT

## 2023-04-16 PROCEDURE — 93306 TTE W/DOPPLER COMPLETE: CPT

## 2023-04-16 PROCEDURE — 80061 LIPID PANEL: CPT

## 2023-04-16 PROCEDURE — 84436 ASSAY OF TOTAL THYROXINE: CPT

## 2023-04-16 PROCEDURE — 85027 COMPLETE CBC AUTOMATED: CPT

## 2023-04-16 PROCEDURE — 86140 C-REACTIVE PROTEIN: CPT

## 2023-04-16 PROCEDURE — 85652 RBC SED RATE AUTOMATED: CPT

## 2023-04-16 PROCEDURE — 70551 MRI BRAIN STEM W/O DYE: CPT

## 2023-04-16 RX ORDER — LIDOCAINE 4 G/100G
1 CREAM TOPICAL
Qty: 10 | Refills: 0
Start: 2023-04-16 | End: 2023-04-25

## 2023-04-16 RX ORDER — ASPIRIN/CALCIUM CARB/MAGNESIUM 324 MG
1 TABLET ORAL
Qty: 0 | Refills: 0 | DISCHARGE

## 2023-04-16 RX ORDER — ACETAMINOPHEN 500 MG
1000 TABLET ORAL ONCE
Refills: 0 | Status: COMPLETED | OUTPATIENT
Start: 2023-04-16 | End: 2023-04-16

## 2023-04-16 RX ADMIN — LIDOCAINE 1 PATCH: 4 CREAM TOPICAL at 03:45

## 2023-04-16 RX ADMIN — LIDOCAINE 1 PATCH: 4 CREAM TOPICAL at 08:02

## 2023-04-16 RX ADMIN — Medication 25 MILLIGRAM(S): at 06:04

## 2023-04-16 RX ADMIN — LIDOCAINE 1 PATCH: 4 CREAM TOPICAL at 18:12

## 2023-04-16 RX ADMIN — Medication 1000 MILLIGRAM(S): at 17:00

## 2023-04-16 RX ADMIN — LIDOCAINE 1 PATCH: 4 CREAM TOPICAL at 13:14

## 2023-04-16 RX ADMIN — Medication 1000 MILLIGRAM(S): at 16:03

## 2023-04-16 NOTE — DISCHARGE NOTE PROVIDER - NSDCMRMEDTOKEN_GEN_ALL_CORE_FT
Crestor 5 mg oral tablet: 1 tab(s) orally once a day  FLUoxetine 40 mg oral capsule: 1 cap(s) orally once a day  hydroCHLOROthiazide 25 mg oral tablet: 1 tab(s) orally once a day  Linzess 145 mcg oral capsule: 1 cap(s) orally once a day  QUEtiapine 50 mg oral tablet: 1 tab(s) orally once a day  Toprol-XL 25 mg oral tablet, extended release: 1 tab(s) orally once a day  zolpidem 12.5 mg oral tablet, extended release: 1 tab(s) orally once a day (at bedtime)   Aspirin Enteric Coated 81 mg oral delayed release tablet: 1 orally once a day  Crestor 5 mg oral tablet: 1 tab(s) orally once a day  FLUoxetine 40 mg oral capsule: 1 cap(s) orally once a day  hydroCHLOROthiazide 25 mg oral tablet: 1 tab(s) orally once a day  Linzess 145 mcg oral capsule: 1 cap(s) orally once a day  QUEtiapine 50 mg oral tablet: 1 tab(s) orally once a day  Salonpas Flex Patch 4% topical film: Apply topically to affected area once a day as needed for  moderate pain Apply 1 patch to each shoulder once daily as needed for pain  Toprol-XL 25 mg oral tablet, extended release: 1 tab(s) orally once a day  zolpidem 12.5 mg oral tablet, extended release: 1 tab(s) orally once a day (at bedtime)

## 2023-04-16 NOTE — DISCHARGE NOTE PROVIDER - CARE PROVIDER_API CALL
DILIA MARIE  Cardiology  [address] [place] [address]  Phone: [phone]  Fax: [phone]  Established Patient  Follow Up Time: 1 week    Norris Simms  INTERNAL MEDICINE  [address] [place] [address], [facility] [address]  [place] [address]  Phone: [phone]  Fax: [phone]  Follow Up Time: 1 month

## 2023-04-16 NOTE — PROGRESS NOTE ADULT - ASSESSMENT
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 ED 4/14/23 s/p witnessed syncope, EKG nonischemic, CE negative x 1 set evaluated for syncope and chronic shoulder pain in [facility]       Syncope.   , EKG SR without acute changes,  neurology recommends inflammatory markers MRI brain   trops negative x 3   monitor orthostatics.  TTE - trace valvular regurgitations otherwise grossly normal      Hypertension.   · continue HOME MEDS: Toprol XL 25mg PO daily and HCTZ 25mg PO daily.      Hyperlipidemia.   · while inpatient,  Atorvastatin 20mg PO qhs.       : Major depression.    continue HOME MEDS: Quetiapine 50mg PO daily and Fluoxetine 40mg PO daily.     5:  ·  Problem: Bilateral shoulder pain.   ·  Plan: followed by pain management Dr. Downey  --received IV injections in bilateral shoulders in the past, currently only taking Tylenol and Aleve PRN at home without relief.  PLEASE CONSULT PAIN MANAGEMENT WHILE PATIENT IS IN HOUSE, Patient requesting possibility of shoulder injection while inpatient

## 2023-04-16 NOTE — DISCHARGE NOTE NURSING/CASE MANAGEMENT/SOCIAL WORK - NSDCPEFALRISK_GEN_ALL_CORE
For information on Fall & Injury Prevention, visit: [email or web address] OR  [email or web address] OR  https://www.cdc.gov/steadi/patient.html

## 2023-04-16 NOTE — DISCHARGE NOTE PROVIDER - NSDCCPCAREPLAN_GEN_ALL_CORE_FT
PRINCIPAL DISCHARGE DIAGNOSIS  Diagnosis: Syncope  Assessment and Plan of Treatment: You presented to the hospital after having a fainting episode with associated speech slurring that lasted 20minutes afterwards.  These symptoms have resolved and we determined that you did not have a stroke on Head Ct scan and MRi Brain.  You also underwent an ECHOCARDIOGRAM of your heart showing you have normal pumping function and no significant valvualr heart disease.  During admission ther were no events noted on the heeat monitor.  You have been cleared for discharge home today and hsould follow up with your Cardiologist Dr Candace Guerrero.  It is recommended that you have a event or holter monitor ordered for you at follow up to continue outpatient monitoring of your heart.  It is also recommened that you follow up with a Neurologist.  The Neurologist that saw you in the hosptial was Dr Norris Simms.  You can elect to follow up with him if he is within your insurance plan.    -Please follow up with your PCP within 1 week.   -Please be sure to keep yourself adequately hydrated .  -We have started you on Aspirin 81mg daily for secondary prevention.      SECONDARY DISCHARGE DIAGNOSES  Diagnosis: Hypertension  Assessment and Plan of Treatment: Continue taking your home dose Metoprolol Succinate ER 50mg daily adn Hydrochlorothiazide 25mg daily.  -Monitor your blood pressure and follow up with your Physician.    Diagnosis: Bilateral shoulder pain  Assessment and Plan of Treatment: We startd you on Lidocaine patch for both shoulders.  You can conitnue to use thsi once daily per directions as needed for pain.   -It is recommended that you follow up with an Orthopedic Shoulder specialist for your arthritic shoulder pain.    Diagnosis: Major depression  Assessment and Plan of Treatment: Continue your home medications Fluoxetine and Quetiapine as prescribed.

## 2023-04-16 NOTE — PROGRESS NOTE ADULT - ASSESSMENT
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 Per history given by the patient, this episode may be due to orthostatic hypotension vs cardiogenic event provoked by lack of sleep, dehydration, use of Etoh with Prozac at that time. Slurred speech lasted ~ 20 min with confusion/amnesia at that moment doesn’t fit to just syncope and she needs further neurological w/up, starting with MRI brain w/o contrast to r/o small stroke. MRI brain w/o contrast showed minium small vessel ischemic disease without acute ischemia. ESR 27 and CRP 12.9    Recommendations:   - syncope w/u per primary team    The case was discussed with attending Dr. 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 Per history given by the patient, this episode may be due to orthostatic hypotension vs cardiogenic event provoked by lack of sleep, dehydration, use of Etoh with Prozac at that time. Slurred speech lasted ~ 20 min with confusion/amnesia at that moment doesn’t fit to just syncope and she needs further neurological w/up, starting with MRI brain w/o contrast to r/o small stroke. MRI brain w/o contrast showed minium small vessel ischemic disease without acute ischemia. ESR 27 and CRP 12.9    Recommendations:   - Syncope w/u per primary team  - Follow up with Neurology outpatient.     Case was discussed with Neurology attending Dr. 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 Per history given by the patient, this episode may be due to orthostatic hypotension vs cardiogenic event provoked by lack of sleep, dehydration, use of Etoh with Prozac at that time. Slurred speech lasted ~ 20 min with confusion/amnesia at that moment doesn’t fit to just syncope and she needs further neurological w/up, starting with MRI brain w/o contrast to r/o small stroke. MRI brain w/o contrast showed minium small vessel ischemic disease without acute ischemia. ESR 27 and CRP 12.9. Patient evaluated by Stroke Team recommending start patient on AAS 81 mg daily and follow up with general neurology outpatient.    Recommendations:   - Syncope w/u per primary team  - Continue with his current management.  - Start on AAS 81 mg QD.  - Follow up with General Neurology outpatient.     Case was discussed with Neurology attending Dr. Simms

## 2023-04-16 NOTE — DISCHARGE NOTE PROVIDER - PROVIDER TOKENS
PROVIDER:[TOKEN:[[id number]],FOLLOWUP:[1 week],ESTABLISHEDPATIENT:[T]],PROVIDER:[TOKEN:[[id number]],FOLLOWUP:[1 month]]

## 2023-04-16 NOTE — DISCHARGE NOTE PROVIDER - HOSPITAL COURSE
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 Patient reports she was having dinner with a group of nurse friends during which she only had 1 drink and then passed out as she walked to the bathroom. Patient is uncertain if she had head trauma,  per witnesses patient had LOC for <2 minutes however when she regained consciousness she briefly had slurred speech for ~20 minutes. Patient denies any preceding N/V, diaphoresis, palpitations, chest pain, PND, orthopnea, LE edema, recent travel or sick contacts. Patient reportedly had a normal TTE and stress test ~ 1 yr ago.    In ED, BP: 135/86, HR: 60s, RR:18, Temp: 98.7F, O2 sat: 98% RA. EKG SR without acute changes. Chest XR unremarkable. Labs notable for: CE negative x 1 set.    Patient was admitted to cardiac telemetry for serial cardiac enzymes and further work-up to R/O cardiac origin of syncope.    During Admission, pt remained asymptomatic, EKG SR without acute changes, CT head non-con unremarkable, Orthostatics negative, Cardiac Enzymes negative x 2, and ECHO showed EF 55-60% with no significant valvular disease, no pericardial effusion, no pulmonary HTN.  Neurology was consulted, MRI Brain showed minimal small vessel ischemic disease without acute ischemia.  No events on telemetry.     Pt feels well today with no acute complaints aside from her chronic shoulder pain 2/2 arthritis.  Pt has been seen and cleared by Dr Gonzalez and Neurology.  Pt recommended to Start ASA 81mg daily and continue her home dose Crestor 5mg daily for secondary prevention. Pt should follow up with outpatient Cardiologist Dr Candace Guerrero and is recommended to have outpatient monitoring such as Holter monitor.  Pt should continue all other home medications for HTN (Toprol XL 50mg daily, HCTZ 25mg daily.  Pt also recommended to follow up with Neurology as outpatient.       Pt reports having all prescriptions at home including Aspirin 81mg and no new prescriptions are required.  Pt already has an appointment to see Dr Guerrero on Tuesday 4/18/2023.        < from: TTE Echo Complete w/o Contrast w/ Doppler (04.15.23 @ 10:59) >    1. Normal left and right ventricular size and systolic function.   2. No significant valvular disease.   3. No evidence of pulmonary hypertension.   4. No pericardial effusion.    < end of copied text >

## 2023-04-16 NOTE — DISCHARGE NOTE NURSING/CASE MANAGEMENT/SOCIAL WORK - PATIENT PORTAL LINK FT
You can access the FollowMyHealth Patient Portal offered by [facility] by registering at the following website: [email or web address]. By joining [employer]’s FollowMyHealth portal, you will also be able to view your health information using other applications (apps) compatible with our system.

## 2023-04-16 NOTE — PROGRESS NOTE ADULT - SUBJECTIVE AND OBJECTIVE BOX
NEUROLOGY CONSULT PROGRESS NOTE    INTERVAL HISTORY: No interval events. No changes neurologically.     REVIEW OF SYSTEMS:  As per HPI, otherwise negative for Constitutional, Eyes, Ears/Nose/Mouth/Throat, Neck, Cardiovascular, Respiratory, Gastrointestinal, Genitourinary, Skin, Endocrine, Musculoskeletal, Psychiatric, and Hematologic/Lymphatic.    MEDICATIONS:  atorvastatin 20 milliGRAM(s) Oral at bedtime  enoxaparin Injectable 40 milliGRAM(s) SubCutaneous every 24 hours  FLUoxetine 40 milliGRAM(s) Oral daily  hydrochlorothiazide 25 milliGRAM(s) Oral daily  lidocaine   4% Patch 1 Patch Transdermal every 24 hours  lidocaine   4% Patch 1 Patch Transdermal every 24 hours  metoprolol succinate ER 25 milliGRAM(s) Oral daily  QUEtiapine 50 milliGRAM(s) Oral at bedtime  zolpidem 5 milliGRAM(s) Oral at bedtime PRN  zolpidem 5 milliGRAM(s) Oral at bedtime PRN    VITAL SIGNS:  Vital Signs Last 24 Hrs  T(C): 36.2 (16 Apr 2023 13:24), Max: 36.3 (15 Apr 2023 22:16)  T(F): 97.2 (16 Apr 2023 13:24), Max: 97.3 (15 Apr 2023 22:16)  HR: 64 (16 Apr 2023 08:50) (62 - 69)  BP: 142/77 (16 Apr 2023 08:50) (120/67 - 142/77)  BP(mean): 105 (16 Apr 2023 05:20) (87 - 108)  RR: 18 (16 Apr 2023 08:50) (15 - 18)  SpO2: 93% (16 Apr 2023 08:50) (93% - 100%)    Parameters below as of 16 Apr 2023 08:50  Patient On (Oxygen Delivery Method): room air    PHYSICAL EXAMINATION:  GEN: NAD, pleasant, cooperative    NEURO:   MENTAL STATUS: AAOx3  LANG/SPEECH: Fluent, intact naming, repetition & comprehension  CRANIAL NERVES:  II: Pupils equal and reactive, no RAPD, normal visual field on confrontation  III, IV, VI: EOM intact, no gaze preference or deviation  V: normal  VII: no gross facial assymmetry.   VIII: normal hearing to speech and rubbing fingers b/l, [place] with lateralization to the R, w BC>AC  MOTOR: 5/5 in both upper and lower extremities  REFLEXES: 2/4 throughout, bilateral flexor plantars  SENSORY: Normal to touch, temperature & pin prick in all extremiteis  COORD: Normal finger to nose and heel to shin, no tremor, no dysmetria  Gait: narrow based. Romberg negative.      LABS:                          13.1   7.55  )-----------( 157      ( 16 Apr 2023 07:54 )             41.3     04-16    137  |  102  |  10  ----------------------------<  120<H>  4.4   |  28  |  1.05    Ca    9.6      16 Apr 2023 07:54  Phos  3.4     04-16  Mg     2.0     04-16      PT/INR - ( 14 Apr 2023 15:57 )   PT: 12.9 sec;   INR: 1.08       PTT - ( 14 Apr 2023 15:57 )  PTT:32.7 sec NEUROLOGY CONSULT PROGRESS NOTE    INTERVAL HISTORY: No interval events. No changes neurologically.     REVIEW OF SYSTEMS:  As per HPI, otherwise negative for Constitutional, Eyes, Ears/Nose/Mouth/Throat, Neck, Cardiovascular, Respiratory, Gastrointestinal, Genitourinary, Skin, Endocrine, Musculoskeletal, Psychiatric, and Hematologic/Lymphatic.    MEDICATIONS:  atorvastatin 20 milliGRAM(s) Oral at bedtime  enoxaparin Injectable 40 milliGRAM(s) SubCutaneous every 24 hours  FLUoxetine 40 milliGRAM(s) Oral daily  hydrochlorothiazide 25 milliGRAM(s) Oral daily  lidocaine   4% Patch 1 Patch Transdermal every 24 hours  lidocaine   4% Patch 1 Patch Transdermal every 24 hours  metoprolol succinate ER 25 milliGRAM(s) Oral daily  QUEtiapine 50 milliGRAM(s) Oral at bedtime  zolpidem 5 milliGRAM(s) Oral at bedtime PRN  zolpidem 5 milliGRAM(s) Oral at bedtime PRN    VITAL SIGNS:  Vital Signs Last 24 Hrs  T(C): 36.2 (16 Apr 2023 13:24), Max: 36.3 (15 Apr 2023 22:16)  T(F): 97.2 (16 Apr 2023 13:24), Max: 97.3 (15 Apr 2023 22:16)  HR: 64 (16 Apr 2023 08:50) (62 - 69)  BP: 142/77 (16 Apr 2023 08:50) (120/67 - 142/77)  BP(mean): 105 (16 Apr 2023 05:20) (87 - 108)  RR: 18 (16 Apr 2023 08:50) (15 - 18)  SpO2: 93% (16 Apr 2023 08:50) (93% - 100%)    Parameters below as of 16 Apr 2023 08:50  Patient On (Oxygen Delivery Method): room air    PHYSICAL EXAMINATION:  GEN: NAD, pleasant, cooperative  NEURO:   MENTAL STATUS: AAOx3  LANG/SPEECH: Fluent, intact naming, repetition & comprehension  CRANIAL NERVES:  II: Pupils equal and reactive, no RAPD, normal visual field on confrontation  III, IV, VI: EOM intact, no gaze preference or deviation  V: normal  VII: no gross facial assymmetry.   VIII: normal hearing to speech and rubbing fingers b/l, Whitehead with lateralization to the R, w BC>AC  MOTOR: 5/5 in both upper and lower extremities  REFLEXES: 2/4 throughout, bilateral flexor plantars  SENSORY: Normal to touch, temperature & pin prick in all extremiteis  COORD: Normal finger to nose and heel to shin, no tremor, no dysmetria  Gait: narrow based. Romberg negative.      LABS:                          13.1   7.55  )-----------( 157      ( 16 Apr 2023 07:54 )             41.3     04-16    137  |  102  |  10  ----------------------------<  120<H>  4.4   |  28  |  1.05    Ca    9.6      16 Apr 2023 07:54  Phos  3.4     04-16  Mg     2.0     04-16      PT/INR - ( 14 Apr 2023 15:57 )   PT: 12.9 sec;   INR: 1.08       PTT - ( 14 Apr 2023 15:57 )  PTT:32.7 sec

## 2023-04-16 NOTE — PROGRESS NOTE ADULT - SUBJECTIVE AND OBJECTIVE BOX
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 ED 4/14/23 s/p witnessed syncope, EKG nonischemic, CE negative x 1 set, presented due to complaints of syncope. 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 Patient also complained of left shoulder pain her Pain management Doctor, Dr Downey follows with her and is a member of [employer] and patient requested f/u for shoulder injection       patient seen and examined resting comfortably at bedside without ovenright events or complaints      Review of Systems:  Other Review of Systems: All other review of systems negative, except as noted in HPI      Allergies and Intolerances:        Allergies:  	Compazine: Drug, Unknown  	ACE inhibitors: Drug Category, Unknown    Home Medications:   * Patient Currently Takes Medications as of 14-Apr-2023 21:06 documented in Structured Notes  · 	hydroCHLOROthiazide 25 mg oral tablet: Last Dose Taken:  , 1 tab(s) orally once a day  · 	zolpidem 12.5 mg oral tablet, extended release: Last Dose Taken:  , 1 tab(s) orally once a day (at bedtime)  · 	QUEtiapine 50 mg oral tablet: Last Dose Taken:  , 1 tab(s) orally once a day  · 	Crestor 5 mg oral tablet: Last Dose Taken:  , 1 tab(s) orally once a day  · 	Toprol-XL 25 mg oral tablet, extended release: Last Dose Taken:  , 1 tab(s) orally once a day  · 	Linzess 145 mcg oral capsule: Last Dose Taken:  , 1 cap(s) orally once a day  · 	FLUoxetine 40 mg oral capsule: Last Dose Taken:  , 1 cap(s) orally once a day    .    Patient History:    Past Medical, Past Surgical, and Family History:  PAST MEDICAL HISTORY:  Depression     GERD (gastroesophageal reflux disease)     HTN (hypertension)     IBS (irritable bowel syndrome).     PAST SURGICAL HISTORY:  History of left knee replacement     History of Nissen fundoplication     Perforated sigmoid colon.     Social History:  · Substance use	No     Tobacco Screening:  · Core Measure Site	No    Risk Assessment:    Present on Admission:  Deep Venous Thrombosis	no  Pulmonary Embolus	no     HIV Screening:  · In accordance with [place] State law, we offer every patient who comes to our ED an HIV test. Would you like to be tested today?	Opt out      Vital Signs Last 24 Hrs  T(C): 36 (16 Apr 2023 09:40), Max: 36.3 (15 Apr 2023 22:16)  T(F): 96.8 (16 Apr 2023 09:40), Max: 97.3 (15 Apr 2023 22:16)  HR: 64 (16 Apr 2023 08:50) (62 - 69)  BP: 142/77 (16 Apr 2023 08:50) (120/67 - 142/77)  BP(mean): 105 (16 Apr 2023 05:20) (87 - 108)  RR: 18 (16 Apr 2023 08:50) (15 - 18)  SpO2: 93% (16 Apr 2023 08:50) (93% - 100%)    Parameters below as of 16 Apr 2023 08:50  Patient On (Oxygen Delivery Method): room air      MEDICATIONS  (STANDING):  atorvastatin 20 milliGRAM(s) Oral at bedtime  enoxaparin Injectable 40 milliGRAM(s) SubCutaneous every 24 hours  FLUoxetine 40 milliGRAM(s) Oral daily  hydrochlorothiazide 25 milliGRAM(s) Oral daily  lidocaine   4% Patch 1 Patch Transdermal every 24 hours  lidocaine   4% Patch 1 Patch Transdermal every 24 hours  metoprolol succinate ER 25 milliGRAM(s) Oral daily  QUEtiapine 50 milliGRAM(s) Oral at bedtime    MEDICATIONS  (PRN):  zolpidem 5 milliGRAM(s) Oral at bedtime PRN Insomnia  zolpidem 5 milliGRAM(s) Oral at bedtime PRN Insomnia         Physical Exam:  · Constitutional	well-groomed; no distress  · Eyes	PERRL; EOMI; conjunctiva clear  · Respiratory	clear to auscultation bilaterally; no wheezes; no rales; no rhonchi  · Cardiovascular	regular rate and rhythm; S1 S2 present  · Gastrointestinal	soft; nontender; nondistended; normal active bowel sounds  · Neurological	cranial nerves II-XII intact; sensation intact  · Skin	warm and dry; color normal; no rashes; no ulcers  · Musculoskeletal	normal; normal gait; ROM intact; strength 5/5 bilateral upper extremities; strength 5/5 bilateral lower extremities  · Psychiatric	normal affect; alert and oriented x3; normal behavior       Labs and Results:  Labs, Radiology, Cardiology, and Other Results: EKG SR without acute changes    Assessment and Plan:   · Completed VTE Risk Assessment(s)	Medical Assessment Completed on: 14-Apr-2023 21:19  · Completed VTE Risk Assessment(s)	Refer to the Assessment tab to view/cancel completed assessment.

## 2023-04-16 NOTE — DISCHARGE NOTE NURSING/CASE MANAGEMENT/SOCIAL WORK - NSDCVIVACCINE_GEN_ALL_CORE_FT
influenza, high-dose, quadrivalent; 29-Sep-2020 10:01; Paloma Brewer (JANETT); [employer]; [id number] (Exp. Date: 30-Jun-2021); IntraMuscular; Deltoid Right.; 0.7 milliLiter(s); VIS (VIS Published: 15-Aug-2019, VIS Presented: 29-Sep-2020);

## 2023-04-20 DIAGNOSIS — I10 ESSENTIAL (PRIMARY) HYPERTENSION: ICD-10-CM

## 2023-04-20 DIAGNOSIS — F32.9 MAJOR DEPRESSIVE DISORDER, SINGLE EPISODE, UNSPECIFIED: ICD-10-CM

## 2023-04-20 DIAGNOSIS — K58.9 IRRITABLE BOWEL SYNDROME WITHOUT DIARRHEA: ICD-10-CM

## 2023-04-20 DIAGNOSIS — Z87.891 PERSONAL HISTORY OF NICOTINE DEPENDENCE: ICD-10-CM

## 2023-04-20 DIAGNOSIS — J43.9 EMPHYSEMA, UNSPECIFIED: ICD-10-CM

## 2023-04-20 DIAGNOSIS — R55 SYNCOPE AND COLLAPSE: ICD-10-CM

## 2023-04-20 DIAGNOSIS — R10.9 UNSPECIFIED ABDOMINAL PAIN: ICD-10-CM

## 2023-04-20 DIAGNOSIS — Z20.822 CONTACT WITH AND (SUSPECTED) EXPOSURE TO COVID-19: ICD-10-CM

## 2023-04-20 DIAGNOSIS — Z87.19 PERSONAL HISTORY OF OTHER DISEASES OF THE DIGESTIVE SYSTEM: ICD-10-CM

## 2023-04-20 DIAGNOSIS — E83.42 HYPOMAGNESEMIA: ICD-10-CM

## 2023-04-20 DIAGNOSIS — E78.5 HYPERLIPIDEMIA, UNSPECIFIED: ICD-10-CM

## 2023-04-20 DIAGNOSIS — Z88.8 ALLERGY STATUS TO OTHER DRUGS, MEDICAMENTS AND BIOLOGICAL SUBSTANCES: ICD-10-CM

## 2023-04-20 DIAGNOSIS — K21.9 GASTRO-ESOPHAGEAL REFLUX DISEASE WITHOUT ESOPHAGITIS: ICD-10-CM

## 2023-04-20 DIAGNOSIS — R47.81 SLURRED SPEECH: ICD-10-CM

## 2023-04-20 DIAGNOSIS — Z96.652 PRESENCE OF LEFT ARTIFICIAL KNEE JOINT: ICD-10-CM

## 2023-04-20 DIAGNOSIS — M19.011 PRIMARY OSTEOARTHRITIS, RIGHT SHOULDER: ICD-10-CM

## 2023-04-20 DIAGNOSIS — G89.29 OTHER CHRONIC PAIN: ICD-10-CM

## 2023-04-20 DIAGNOSIS — M19.012 PRIMARY OSTEOARTHRITIS, LEFT SHOULDER: ICD-10-CM

## 2023-04-27 PROBLEM — K58.9 IRRITABLE BOWEL SYNDROME WITHOUT DIARRHEA: Chronic | Status: ACTIVE | Noted: 2023-04-14

## 2023-05-31 ENCOUNTER — APPOINTMENT (OUTPATIENT)
Dept: PULMONOLOGY | Facility: CLINIC | Age: 73
End: 2023-05-31
Payer: MEDICARE

## 2023-05-31 VITALS
OXYGEN SATURATION: 94 % | HEIGHT: 66 IN | HEART RATE: 73 BPM | WEIGHT: 194 LBS | BODY MASS INDEX: 31.18 KG/M2 | SYSTOLIC BLOOD PRESSURE: 114 MMHG | DIASTOLIC BLOOD PRESSURE: 79 MMHG | TEMPERATURE: 36.6 F

## 2023-05-31 DIAGNOSIS — Z80.51 FAMILY HISTORY OF MALIGNANT NEOPLASM OF KIDNEY: ICD-10-CM

## 2023-05-31 DIAGNOSIS — Z87.891 PERSONAL HISTORY OF NICOTINE DEPENDENCE: ICD-10-CM

## 2023-05-31 DIAGNOSIS — Z82.49 FAMILY HISTORY OF ISCHEMIC HEART DISEASE AND OTHER DISEASES OF THE CIRCULATORY SYSTEM: ICD-10-CM

## 2023-05-31 PROCEDURE — 99204 OFFICE O/P NEW MOD 45 MIN: CPT

## 2023-06-01 PROBLEM — Z82.49 FAMILY HISTORY OF HYPERTENSION: Status: ACTIVE | Noted: 2023-06-01

## 2023-06-01 PROBLEM — Z87.891 FORMER SMOKER: Status: ACTIVE | Noted: 2023-06-01

## 2023-06-01 PROBLEM — Z80.51 FAMILY HISTORY OF MALIGNANT NEOPLASM OF KIDNEY: Status: ACTIVE | Noted: 2023-06-01

## 2023-06-01 NOTE — PHYSICAL EXAM
[No Acute Distress] : no acute distress [Normal Oropharynx] : normal oropharynx [Normal Appearance] : normal appearance [No Neck Mass] : no neck mass [Normal Rate/Rhythm] : normal rate/rhythm [Normal S1, S2] : normal s1, s2 [No Resp Distress] : no resp distress [Clear to Auscultation Bilaterally] : clear to auscultation bilaterally [No Clubbing] : no clubbing [No Edema] : no edema [Oriented x3] : oriented x3 [Normal Affect] : normal affect [No Murmurs] : no murmurs

## 2023-06-02 NOTE — HISTORY OF PRESENT ILLNESS
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 \par \par 5/21/23 Initial visit \par Notes has been short of breath for the past 2 years. Able to walk two blocks but limited by shortness of breath but has not progressed. Avoids subways because of KUMAR climbing steps. Mild intermittent cough and sputum production since April but not significant. No wheezing or chest tightness. She is pending shoulder surgery. Recent CT chest/abd pelvis done at [place] and was concerned about the read of report mentioning emphysema. She recently had episode of presyncope. Had normal echo and is pending the report of her Holter monitor. Former smoker 0.5 ppd for 25 years and quit 25 years ago. No hx of pulmonary problems. 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 No fam hx of pulmonary problems or lung cancer. Does have fam hx of CAD, HTN, and father w/ kidney cancer.

## 2023-06-02 NOTE — CONSULT LETTER
[Dear  ___] : Dear  [unfilled], [Consult Letter:] : I had the pleasure of evaluating your patient, [unfilled]. [Please see my note below.] : Please see my note below. [Consult Closing:] : Thank you very much for allowing me to participate in the care of this patient.  If you have any questions, please do not hesitate to contact me. [FreeTextEntry3] : Sincerely\par \par Víctor Frias MD [facility]\par , [employer] [facility] [employer]\par Associate , Pulmonary and Critical Care Fellowship\par Pulmonary and Critical Care\par [facility]\par Phone: [phone]\par

## 2023-06-02 NOTE — ASSESSMENT
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 \par \par \par #abnormal CT chest\par Report from [facility] chart was reviewed but no images were able to be seen. 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 Did state diffuse bronchial wall thickening and b/l atelectasis w/ micronodules 4mm. CXR 4/23 no infiltrates. \par -she will email report and bring CT images for our review\par \par #former smoker; pulmonary nodules\par Multiple subcentimeter lung nodules (up to 4mm) noted on CT as above; again will need to review images. Likely low risk for lung cancer given <20 ppy hx and quit more than 25 years ago and no fam hx of lung cancer.\par -repeat CT in 1 year\par \par #SOB\par Former smoker, no personal or family hx of lung problems. Unable to walk long distances or climb stairs. \par Sees a cardiologist, recent echo unremarkable. Holter monitor pending. Multiple ddx --deconditioning, COPD, microaspiration from severe GERD, bronchiectasis. \par -PFT/6MWT\par -follow up Holter results \par \par Follow up after PFTs/6MWT.

## 2023-06-02 NOTE — REVIEW OF SYSTEMS
[Cough] : cough [Sputum] : sputum [Dyspnea] : dyspnea [SOB on Exertion] : sob on exertion [Fever] : no fever [Fatigue] : no fatigue [Chills] : no chills [Recent Wt Loss (___ Lbs)] : ~T no recent weight loss [Dry Eyes] : no dry eyes [Eye Irritation] : no eye irritation [Nasal Congestion] : no nasal congestion [Postnasal Drip] : no postnasal drip [Chest Tightness] : no chest tightness [Pleuritic Pain] : no pleuritic pain [Wheezing] : no wheezing [Chest Discomfort] : no chest discomfort [Edema] : no edema [Orthopnea] : no orthopnea [Hay Fever] : no hay fever [Itchy Eyes] : no itchy eyes [GERD] : no gerd [Diarrhea] : no diarrhea [Headache] : no headache [Dizziness] : no dizziness

## 2023-06-02 NOTE — DISCUSSION/SUMMARY
[FreeTextEntry1] : Echo 4/18/23 showed EF 55-60% with no significant valvular disease, no pericardial effusion, no pulmonary HTN\par Chest XR 4/14/23: No acute infiltrates\par CT Angiogram 3/23: No gross mediastinal or hilar adenopathy identified.No pleural effusions are evident. Linear band of subsegmental atelectasis within the lingula.

## 2023-06-05 ENCOUNTER — APPOINTMENT (OUTPATIENT)
Dept: PULMONOLOGY | Facility: CLINIC | Age: 73
End: 2023-06-05
Payer: MEDICARE

## 2023-06-05 VITALS
OXYGEN SATURATION: 97 % | HEART RATE: 80 BPM | DIASTOLIC BLOOD PRESSURE: 72 MMHG | HEIGHT: 66 IN | TEMPERATURE: 97.8 F | SYSTOLIC BLOOD PRESSURE: 108 MMHG

## 2023-06-05 DIAGNOSIS — J44.9 CHRONIC OBSTRUCTIVE PULMONARY DISEASE, UNSPECIFIED: ICD-10-CM

## 2023-06-05 DIAGNOSIS — R06.02 SHORTNESS OF BREATH: ICD-10-CM

## 2023-06-05 PROCEDURE — ZZZZZ: CPT

## 2023-06-05 PROCEDURE — 94729 DIFFUSING CAPACITY: CPT

## 2023-06-05 PROCEDURE — 99214 OFFICE O/P EST MOD 30 MIN: CPT | Mod: 25

## 2023-06-05 PROCEDURE — 94726 PLETHYSMOGRAPHY LUNG VOLUMES: CPT

## 2023-06-05 PROCEDURE — 94060 EVALUATION OF WHEEZING: CPT

## 2023-06-05 PROCEDURE — 94618 PULMONARY STRESS TESTING: CPT

## 2023-06-05 NOTE — PHYSICAL EXAM
[No Acute Distress] : no acute distress [Normal Oropharynx] : normal oropharynx [Normal Rate/Rhythm] : normal rate/rhythm [Normal S1, S2] : normal s1, s2 [No Murmurs] : no murmurs [No Resp Distress] : no resp distress [Clear to Auscultation Bilaterally] : clear to auscultation bilaterally [No Abnormalities] : no abnormalities [No Clubbing] : no clubbing [No Edema] : no edema [Oriented x3] : oriented x3 [Normal Affect] : normal affect

## 2023-06-05 NOTE — REVIEW OF SYSTEMS
[Dyspnea] : dyspnea [SOB on Exertion] : sob on exertion [Fever] : no fever [Chills] : no chills [Dry Eyes] : no dry eyes [Eye Irritation] : no eye irritation [Nasal Congestion] : no nasal congestion [Postnasal Drip] : no postnasal drip [Cough] : no cough [Chest Tightness] : no chest tightness [Sputum] : no sputum [Pleuritic Pain] : no pleuritic pain [Wheezing] : no wheezing [Chest Discomfort] : no chest discomfort [GERD] : no gerd [Diarrhea] : no diarrhea

## 2023-06-05 NOTE — DISCUSSION/SUMMARY
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 \par \par Reviewed:\par Echo 4/18/23 showed EF 55-60% with no significant valvular disease, no pericardial effusion, no pulmonary HTN\par Chest XR 4/14/23: No acute infiltrates\par CT Angiogram 3/23: No gross mediastinal or hilar adenopathy identified. No pleural effusions are evident. Linear band of subsegmental atelectasis within the lingula. \par \par PFT (6/5/23): FEV 1 103  , FEV1/FVC 76  , TLC 84, DLCO 59, REV -2\par \par \par #abnormal CT chest\par Report from [facility] chart was reviewed. 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 Did state diffuse bronchial wall thickening and b/l atelectasis w/ micronodules 4mm. CXR 4/23 no infiltrates. \par -she will email report and bring CT images for our review (still pending)\par \par #former smoker; pulmonary nodules\par Multiple subcentimeter lung nodules (up to 4mm) noted on CT as above.Likely low risk for lung cancer given <20 ppy hx and quit more than 25 years ago and no fam hx of lung cancer.\par -repeat CT in 1 year\par \par #SOB\par Former smoker, no personal or family hx of lung problems. Unable to walk long distances or climb stairs. \par Sees a cardiologist, recent echo unremarkable. Holter monitor pending. Multiple ddx --deconditioning, COPD, microaspiration from severe GERD (seeing GI, started PPI for Wheeler's esophagus). PFTs with normal spirometry, reduced DLCO which may be due to component of emphysema.\par - Start Breo inhaler with gargle \par - Follow up Holter results \par \par Follow-up in 1 month

## 2023-06-05 NOTE — CONSULT LETTER
[Dear  ___] : Dear  [unfilled], [Consult Letter:] : I had the pleasure of evaluating your patient, [unfilled]. [Please see my note below.] : Please see my note below. [Consult Closing:] : Thank you very much for allowing me to participate in the care of this patient.  If you have any questions, please do not hesitate to contact me. [FreeTextEntry3] : Sincerely\par \par Víctor Frias MD [facility]\par , [facility] [employer]\par Associate , Pulmonary and Critical Care Fellowship\par Pulmonary and Critical Care\par [facility]\par Phone: [phone]\par

## 2023-06-05 NOTE — HISTORY OF PRESENT ILLNESS
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 \par \par 5/21/23 Initial visit \par Notes has been short of breath for the past 2 years. Able to walk two blocks but limited by shortness of breath but has not progressed. Avoids subways because of KUMAR climbing steps. Mild intermittent cough and sputum production since April but not significant. No wheezing or chest tightness. She is pending shoulder surgery. Recent CT chest/abd pelvis done at [place] and was concerned about the read of report mentioning emphysema. She recently had episode of presyncope. Had normal echo and is pending the report of her Holter monitor. Former smoker 0.5 ppd for 25 years and quit 25 years ago. No hx of pulmonary problems. 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 No fam hx of pulmonary problems or lung cancer. Does have fam hx of CAD, HTN, and father w/ kidney cancer. \par \par 6/5/23\par PFTs today. Continues to have SOB after walking 2 blocks. Denies recent weight gain. Has significant GERD, seeing GI for Wheeler's Esophagus.  [ESS] : 5

## 2023-06-15 NOTE — PROGRESS NOTE ADULT - PROBLEM/PLAN-9
Pt would like to talk to Dr [place] regarding new medication she is on  She knows  is out until tomorrow  Pls call Friday  DISPLAY PLAN FREE TEXT

## 2023-07-03 ENCOUNTER — APPOINTMENT (OUTPATIENT)
Dept: PULMONOLOGY | Facility: CLINIC | Age: 73
End: 2023-07-03

## 2023-07-18 NOTE — PHYSICAL EXAM
[No Acute Distress] : no acute distress [Normal Oropharynx] : normal oropharynx [Normal Rate/Rhythm] : normal rate/rhythm [Normal S1, S2] : normal s1, s2 [No Resp Distress] : no resp distress [Clear to Auscultation Bilaterally] : clear to auscultation bilaterally [No Abnormalities] : no abnormalities [No Clubbing] : no clubbing [No Edema] : no edema [Oriented x3] : oriented x3 [Normal Affect] : normal affect

## 2023-07-19 ENCOUNTER — APPOINTMENT (OUTPATIENT)
Dept: PULMONOLOGY | Facility: CLINIC | Age: 73
End: 2023-07-19
Payer: MEDICARE

## 2023-07-19 VITALS
OXYGEN SATURATION: 94 % | HEART RATE: 80 BPM | HEIGHT: 66 IN | BODY MASS INDEX: 32.14 KG/M2 | DIASTOLIC BLOOD PRESSURE: 86 MMHG | SYSTOLIC BLOOD PRESSURE: 123 MMHG | WEIGHT: 200 LBS | TEMPERATURE: 97.3 F | RESPIRATION RATE: 12 BRPM

## 2023-07-19 DIAGNOSIS — R91.1 SOLITARY PULMONARY NODULE: ICD-10-CM

## 2023-07-19 DIAGNOSIS — J45.909 UNSPECIFIED ASTHMA, UNCOMPLICATED: ICD-10-CM

## 2023-07-19 PROCEDURE — 99214 OFFICE O/P EST MOD 30 MIN: CPT

## 2023-07-21 PROBLEM — R91.1 LUNG NODULE: Status: ACTIVE | Noted: 2023-07-21

## 2023-07-21 PROBLEM — J45.909 ASTHMA: Status: ACTIVE | Noted: 2023-07-21

## 2023-07-21 NOTE — REVIEW OF SYSTEMS
[Fever] : no fever [Chills] : no chills [Dry Eyes] : no dry eyes [Eye Irritation] : no eye irritation [Nasal Congestion] : no nasal congestion [Postnasal Drip] : no postnasal drip [Cough] : no cough [Chest Tightness] : no chest tightness [Sputum] : no sputum [Dyspnea] : no dyspnea [Pleuritic Pain] : no pleuritic pain [Wheezing] : no wheezing [SOB on Exertion] : no sob on exertion [GERD] : no gerd

## 2023-07-21 NOTE — CONSULT LETTER
[Dear  ___] : Dear  [unfilled], [Consult Letter:] : I had the pleasure of evaluating your patient, [unfilled]. [Please see my note below.] : Please see my note below. [Consult Closing:] : Thank you very much for allowing me to participate in the care of this patient.  If you have any questions, please do not hesitate to contact me. [FreeTextEntry3] : Sincerely\par \par V[facility]ctor Frias MD [facility]\par , [employer] [facility] [employer]\par Associate , Pulmonary and Critical Care Fellowship\par Pulmonary and Critical Care\par [facility]\par Phone: [phone]\par

## 2023-07-21 NOTE — DISCUSSION/SUMMARY
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 \par \par Reviewed:\par Echo 4/18/23 showed EF 55-60% with no significant valvular disease, no pericardial effusion, no pulmonary HTN\par Chest XR 4/14/23: No acute infiltrates\par CT Angiogram 3/23: No gross mediastinal or hilar adenopathy identified. No pleural effusions are evident. Linear band of subsegmental atelectasis within the lingula. \par \par PFT (6/5/23): FEV 1 103 , FEV1/FVC 76 , TLC 84, DLCO 59, REV -2\par 6MWT (6/5/23): 97% on RA, 94% on exertion\par \par A/P\par (1) Pre-operative Pulmonary Exam:\par risk stratification: Patient's ARISCAT Score 11 points. Patient has good functional status, her asthma is optimized at present. She has  (1.6%) risk of in-hospital post-op pulmonary complications (composite including respiratory failure, respiratory infection, pleural effusion, atelectasis, pneumothorax, bronchospasm, aspiration pneumonitis).\par Recommendation:\par Continue Breo on the day of surgery\par - Avoid Opioids during surgery\par - Extubate in upright position\par - Would recommend to extubate once patient is awake\par - BIPAP may be required after extubation\par \par (2) Asthma:\par Former smoker, no personal or family hx of lung problems.  Sees a cardiologist, recent echo unremarkable. Now with significant improvement in both SOB and cough with use of Breo inhaler x 1 month. PFTs with normal spirometry, reduced DLCO. Additional factors may include deconditioning, microaspiration from severe GERD (seeing GI, started PPI for Wheeler's esophagus). \par - Continue Breo inhaler with gargle \par - Continue cardiology and GI follow-up \par \par (3) Former smoker; pulmonary nodules:\par - Report from [facility] [place] chart was reviewed. 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 Did state diffuse bronchial wall thickening and b/l atelectasis w/ micronodules 4mm. CXR 4/23 no infiltrates. \par -She will email report and bring CT images for our review (still pending)\par - Multiple subcentimeter lung nodules (up to 4mm) noted on CT as above.Likely low risk for lung cancer given <20 ppy hx and quit more than 25 years ago and no fam hx of lung cancer.\par -Repeat CT in 1 year, 5/24\par \par Follow-up in 6 months with Dr. Riojas \par

## 2023-07-21 NOTE — HISTORY OF PRESENT ILLNESS
[Former] : former [TextBox_4] : 5/21/23 Initial visit \par Notes has been short of breath for the past 2 years. Able to walk two blocks but limited by shortness of breath but has not progressed. Avoids subways because of KUMAR climbing steps. Mild intermittent cough and sputum production since April but not significant. No wheezing or chest tightness. She is pending shoulder surgery. Recent CT chest/abd pelvis done at [facility] and was concerned about the read of report mentioning emphysema. She recently had episode of presyncope. Had normal echo and is pending the report of her Holter monitor. Former smoker 0.5 ppd for 25 years and quit 25 years ago. No hx of pulmonary problems. 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 No fam hx of pulmonary problems or lung cancer. Does have fam hx of CAD, HTN, and father w/ kidney cancer. \par \par 6/5/23\par PFTs today. Continues to have SOB after walking 2 blocks. Denies recent weight gain. Has significant GERD, seeing GI for Wheeler's Esophagus. \par \par 7/19/23:\par Needs pre-op clearance prior to left shoulder surgery done with Dr. Shaw at [facility]  [phone]. Started Breo at the last visit, notes significant improvement in SOB and cough, using albuterol inhaler infrequently. [ESS] : 5

## 2023-08-24 RX ORDER — FLUTICASONE FUROATE AND VILANTEROL TRIFENATATE 200; 25 UG/1; UG/1
200-25 POWDER RESPIRATORY (INHALATION) DAILY
Qty: 1 | Refills: 5 | Status: ACTIVE | COMMUNITY
Start: 2023-06-05 | End: 1900-01-01

## 2024-01-23 ENCOUNTER — APPOINTMENT (OUTPATIENT)
Dept: PULMONOLOGY | Facility: CLINIC | Age: 74
End: 2024-01-23

## 2025-01-28 NOTE — DISCHARGE NOTE NURSING/CASE MANAGEMENT/SOCIAL WORK - NSDPDISTO_GEN_ALL_CORE
Department of Anesthesiology  Postprocedure Note    Patient: Saturnino Grady  MRN: [id number]  YOB: 1958  Date of evaluation: 1/28/2025    Procedure Summary       Date: 01/28/25 Room / Location: [facility] OR 01 / [facility]    Anesthesia Start: 0826 Anesthesia Stop: 0837    Procedure: ESOPHAGOGASTRODUODENOSCOPY Diagnosis:       Odynophagia      (Odynophagia [R13.10])    Surgeons: Joseph Rodríguez MD Responsible Provider: Johanna Madrigal APRN - CRNA    Anesthesia Type: MAC ASA Status: 2            Anesthesia Type: No value filed.    Pxaton Phase I: Paxton Score: 10    Paxton Phase II:      Anesthesia Post Evaluation    Patient location during evaluation: PACU  Level of consciousness: awake  Pain score: 0  Airway patency: patent  Nausea & Vomiting: no vomiting and no nausea  Cardiovascular status: hemodynamically stable  Respiratory status: acceptable  Hydration status: stable  Pain management: adequate and satisfactory to patient        No notable events documented.   Home

## 2025-05-30 NOTE — ED PROVIDER NOTE - PSYCHIATRIC, MLM
Isai
Alert and oriented to person, place, time/situation. normal mood and affect. no apparent risk to self or others.